# Patient Record
Sex: FEMALE | URBAN - METROPOLITAN AREA
[De-identification: names, ages, dates, MRNs, and addresses within clinical notes are randomized per-mention and may not be internally consistent; named-entity substitution may affect disease eponyms.]

---

## 2017-07-20 ENCOUNTER — IMPORTED ENCOUNTER (OUTPATIENT)
Dept: URBAN - METROPOLITAN AREA CLINIC 1 | Facility: CLINIC | Age: 57
End: 2017-07-20

## 2017-07-20 PROBLEM — H04.123: Noted: 2017-07-20

## 2017-07-20 PROBLEM — E11.3292: Noted: 2017-07-20

## 2017-07-20 PROBLEM — Z79.84: Noted: 2017-07-20

## 2017-07-20 PROBLEM — E11.9: Noted: 2017-07-20

## 2017-07-20 PROBLEM — H16.143: Noted: 2017-07-20

## 2017-07-20 PROBLEM — H25.813: Noted: 2017-07-20

## 2017-07-20 PROCEDURE — 92004 COMPRE OPH EXAM NEW PT 1/>: CPT

## 2017-07-20 NOTE — PATIENT DISCUSSION
1.  DM Type II (Oral Meds) without sign of diabetic retinopathy and no blot heme on dilated retinal examination today OD No Macular Edema:  Discussed the pathophysiology of diabetes and its effect on the eye and risk of blindness. Stressed the importance of strong glucose control. Advised of importance of at least yearly dilated examinations but to contact us immediately for any problems or concerns. 2.  DM Type II (Oral Meds) with mild Nonproliferative Diabetic Retinopathy OS No Macular Edema:  Discussed the pathophysiology of diabetes and its effect on the eye and risk of blindness. Stressed the importance of strong glucose control. Advised of importance of at least yearly dilated examinations but to contact us immediately for any problems or concerns. 3. AJ w/ PEK OU- Increase ATs TID OU Routinely; Use AT Gel Adonis QHS OU. 4.  Cataract OU: Observe for now without intervention. The patient was advised to contact us if any change or worsening of visionLetter to PCP Return for an appointment in 6 months 10 dfe with Dr. Dano Amezquita.

## 2017-08-04 RX ORDER — ONDANSETRON 4 MG/1
4 TABLET, ORALLY DISINTEGRATING ORAL AS NEEDED
Status: ON HOLD | COMMUNITY
Start: 2017-05-23 | End: 2017-08-10

## 2017-08-04 RX ORDER — DIPHENHYDRAMINE HCL 25 MG
25 TABLET ORAL
COMMUNITY

## 2017-08-04 RX ORDER — HYDROCODONE BITARTRATE AND ACETAMINOPHEN 10; 325 MG/1; MG/1
1 TABLET ORAL
COMMUNITY
End: 2017-08-10

## 2017-08-04 RX ORDER — GABAPENTIN 300 MG/1
300 CAPSULE ORAL 3 TIMES DAILY
COMMUNITY

## 2017-08-04 RX ORDER — ASPIRIN 81 MG/1
81 TABLET ORAL DAILY
COMMUNITY

## 2017-08-04 RX ORDER — ESOMEPRAZOLE MAGNESIUM 40 MG/1
40 CAPSULE, DELAYED RELEASE ORAL DAILY
Refills: 1 | COMMUNITY
Start: 2017-06-21 | End: 2020-04-08

## 2017-08-04 RX ORDER — METFORMIN HYDROCHLORIDE 750 MG/1
750 TABLET, EXTENDED RELEASE ORAL
Refills: 3 | COMMUNITY
Start: 2017-06-14 | End: 2020-04-08

## 2017-08-04 RX ORDER — UREA 10 %
800 LOTION (ML) TOPICAL DAILY
COMMUNITY

## 2017-08-04 RX ORDER — LISINOPRIL 2.5 MG/1
2.5 TABLET ORAL
COMMUNITY
Start: 2011-03-04

## 2017-08-04 RX ORDER — ERGOCALCIFEROL 1.25 MG/1
50000 CAPSULE ORAL 2 TIMES WEEKLY
COMMUNITY

## 2017-08-04 RX ORDER — ALPRAZOLAM 0.25 MG/1
0.25 TABLET ORAL
COMMUNITY

## 2017-08-04 RX ORDER — ATORVASTATIN CALCIUM 40 MG/1
40 TABLET, FILM COATED ORAL
COMMUNITY

## 2017-08-04 RX ORDER — HYDROMORPHONE HYDROCHLORIDE 2 MG/1
2 TABLET ORAL
Refills: 0 | COMMUNITY
Start: 2017-05-23 | End: 2017-08-10

## 2017-08-08 ENCOUNTER — ANESTHESIA EVENT (OUTPATIENT)
Dept: SURGERY | Age: 57
End: 2017-08-08
Payer: OTHER MISCELLANEOUS

## 2017-08-09 ENCOUNTER — APPOINTMENT (OUTPATIENT)
Dept: GENERAL RADIOLOGY | Age: 57
End: 2017-08-09
Attending: ORTHOPAEDIC SURGERY
Payer: OTHER MISCELLANEOUS

## 2017-08-09 ENCOUNTER — HOSPITAL ENCOUNTER (OUTPATIENT)
Age: 57
Setting detail: OBSERVATION
Discharge: HOME | End: 2017-08-10
Attending: ORTHOPAEDIC SURGERY | Admitting: ORTHOPAEDIC SURGERY
Payer: OTHER MISCELLANEOUS

## 2017-08-09 ENCOUNTER — ANESTHESIA (OUTPATIENT)
Dept: SURGERY | Age: 57
End: 2017-08-09
Payer: OTHER MISCELLANEOUS

## 2017-08-09 PROBLEM — M50.20 HNP (HERNIATED NUCLEUS PULPOSUS), CERVICAL: Status: ACTIVE | Noted: 2017-08-09

## 2017-08-09 LAB
EST. AVERAGE GLUCOSE BLD GHB EST-MCNC: 180 MG/DL
GLUCOSE BLD STRIP.AUTO-MCNC: 129 MG/DL (ref 70–110)
GLUCOSE BLD STRIP.AUTO-MCNC: 130 MG/DL (ref 70–110)
GLUCOSE BLD STRIP.AUTO-MCNC: 182 MG/DL (ref 70–110)
HBA1C MFR BLD: 7.9 % (ref 4.2–5.6)

## 2017-08-09 PROCEDURE — 77030003666 HC NDL SPINAL BD -A: Performed by: ORTHOPAEDIC SURGERY

## 2017-08-09 PROCEDURE — 77030020274 HC MISC IMPL ORTHOPEDIC: Performed by: ORTHOPAEDIC SURGERY

## 2017-08-09 PROCEDURE — 77030011640 HC PAD GRND REM COVD -A: Performed by: ORTHOPAEDIC SURGERY

## 2017-08-09 PROCEDURE — 77030006773 HC BLD SAW OSC BRSM -A: Performed by: ORTHOPAEDIC SURGERY

## 2017-08-09 PROCEDURE — 77030037736 HC GRFT BN BLCK ALLGRFT REGE -F: Performed by: ORTHOPAEDIC SURGERY

## 2017-08-09 PROCEDURE — C1713 ANCHOR/SCREW BN/BN,TIS/BN: HCPCS | Performed by: ORTHOPAEDIC SURGERY

## 2017-08-09 PROCEDURE — 36415 COLL VENOUS BLD VENIPUNCTURE: CPT | Performed by: HOSPITALIST

## 2017-08-09 PROCEDURE — 74011250636 HC RX REV CODE- 250/636: Performed by: NURSE ANESTHETIST, CERTIFIED REGISTERED

## 2017-08-09 PROCEDURE — 77030004391 HC BUR FLUT MEDT -C: Performed by: ORTHOPAEDIC SURGERY

## 2017-08-09 PROCEDURE — 76210000017 HC OR PH I REC 1.5 TO 2 HR: Performed by: ORTHOPAEDIC SURGERY

## 2017-08-09 PROCEDURE — 77030031139 HC SUT VCRL2 J&J -A: Performed by: ORTHOPAEDIC SURGERY

## 2017-08-09 PROCEDURE — 74011250636 HC RX REV CODE- 250/636: Performed by: ORTHOPAEDIC SURGERY

## 2017-08-09 PROCEDURE — 77030010938 HC CLP LIG TELE -A: Performed by: ORTHOPAEDIC SURGERY

## 2017-08-09 PROCEDURE — 74011250636 HC RX REV CODE- 250/636

## 2017-08-09 PROCEDURE — 74011000250 HC RX REV CODE- 250: Performed by: ORTHOPAEDIC SURGERY

## 2017-08-09 PROCEDURE — 74011000258 HC RX REV CODE- 258: Performed by: ORTHOPAEDIC SURGERY

## 2017-08-09 PROCEDURE — 74011000272 HC RX REV CODE- 272: Performed by: ORTHOPAEDIC SURGERY

## 2017-08-09 PROCEDURE — 83036 HEMOGLOBIN GLYCOSYLATED A1C: CPT | Performed by: HOSPITALIST

## 2017-08-09 PROCEDURE — 99218 HC RM OBSERVATION: CPT

## 2017-08-09 PROCEDURE — 77030014007 HC SPNG HEMSTAT J&J -B: Performed by: ORTHOPAEDIC SURGERY

## 2017-08-09 PROCEDURE — 77030008683 HC TU ET CUF COVD -A: Performed by: ANESTHESIOLOGY

## 2017-08-09 PROCEDURE — 76060000036 HC ANESTHESIA 2.5 TO 3 HR: Performed by: ORTHOPAEDIC SURGERY

## 2017-08-09 PROCEDURE — 77030030163 HC BN WAX J&J -A: Performed by: ORTHOPAEDIC SURGERY

## 2017-08-09 PROCEDURE — 76010000172 HC OR TIME 2.5 TO 3 HR INTENSV-TIER 1: Performed by: ORTHOPAEDIC SURGERY

## 2017-08-09 PROCEDURE — 77030009868 HC PIN DISTR CASPR AESC -B: Performed by: ORTHOPAEDIC SURGERY

## 2017-08-09 PROCEDURE — 77030010516 HC APPL HEMA CLP TELE -B: Performed by: ORTHOPAEDIC SURGERY

## 2017-08-09 PROCEDURE — 77030008477 HC STYL SATN SLP COVD -A: Performed by: ANESTHESIOLOGY

## 2017-08-09 PROCEDURE — L0120 CERV FLEX N/ADJ FOAM PRE OTS: HCPCS | Performed by: ORTHOPAEDIC SURGERY

## 2017-08-09 PROCEDURE — 77030018673: Performed by: ORTHOPAEDIC SURGERY

## 2017-08-09 PROCEDURE — 74011250637 HC RX REV CODE- 250/637: Performed by: NURSE ANESTHETIST, CERTIFIED REGISTERED

## 2017-08-09 PROCEDURE — 74011636637 HC RX REV CODE- 636/637: Performed by: ORTHOPAEDIC SURGERY

## 2017-08-09 PROCEDURE — 77030018836 HC SOL IRR NACL ICUM -A: Performed by: ORTHOPAEDIC SURGERY

## 2017-08-09 PROCEDURE — 77030021678 HC GLIDESCP STAT DISP VERT -B: Performed by: ANESTHESIOLOGY

## 2017-08-09 PROCEDURE — 77030030102 HC BIT DRL PYRNES K2M -B: Performed by: ORTHOPAEDIC SURGERY

## 2017-08-09 PROCEDURE — 74011250637 HC RX REV CODE- 250/637: Performed by: ORTHOPAEDIC SURGERY

## 2017-08-09 PROCEDURE — 77030013079 HC BLNKT BAIR HGGR 3M -A: Performed by: ANESTHESIOLOGY

## 2017-08-09 PROCEDURE — 74011000250 HC RX REV CODE- 250

## 2017-08-09 PROCEDURE — 77030027138 HC INCENT SPIROMETER -A

## 2017-08-09 PROCEDURE — 82962 GLUCOSE BLOOD TEST: CPT

## 2017-08-09 PROCEDURE — 77030006788 HC BLD SAW OSC STRY -B: Performed by: ORTHOPAEDIC SURGERY

## 2017-08-09 PROCEDURE — 74011250636 HC RX REV CODE- 250/636: Performed by: ANESTHESIOLOGY

## 2017-08-09 PROCEDURE — 77030032490 HC SLV COMPR SCD KNE COVD -B: Performed by: ORTHOPAEDIC SURGERY

## 2017-08-09 DEVICE — IMPLANTABLE DEVICE: Type: IMPLANTABLE DEVICE | Site: SPINE CERVICAL | Status: FUNCTIONAL

## 2017-08-09 RX ORDER — DEXAMETHASONE SODIUM PHOSPHATE 4 MG/ML
INJECTION, SOLUTION INTRA-ARTICULAR; INTRALESIONAL; INTRAMUSCULAR; INTRAVENOUS; SOFT TISSUE AS NEEDED
Status: DISCONTINUED | OUTPATIENT
Start: 2017-08-09 | End: 2017-08-09 | Stop reason: HOSPADM

## 2017-08-09 RX ORDER — ONDANSETRON 2 MG/ML
INJECTION INTRAMUSCULAR; INTRAVENOUS AS NEEDED
Status: DISCONTINUED | OUTPATIENT
Start: 2017-08-09 | End: 2017-08-09 | Stop reason: HOSPADM

## 2017-08-09 RX ORDER — HYDROMORPHONE HYDROCHLORIDE 2 MG/ML
2 INJECTION, SOLUTION INTRAMUSCULAR; INTRAVENOUS; SUBCUTANEOUS
Status: DISCONTINUED | OUTPATIENT
Start: 2017-08-09 | End: 2017-08-10

## 2017-08-09 RX ORDER — PROPOFOL 10 MG/ML
INJECTION, EMULSION INTRAVENOUS AS NEEDED
Status: DISCONTINUED | OUTPATIENT
Start: 2017-08-09 | End: 2017-08-09 | Stop reason: HOSPADM

## 2017-08-09 RX ORDER — SODIUM CHLORIDE AND POTASSIUM CHLORIDE .9; .15 G/100ML; G/100ML
SOLUTION INTRAVENOUS CONTINUOUS
Status: DISCONTINUED | OUTPATIENT
Start: 2017-08-09 | End: 2017-08-10 | Stop reason: HOSPADM

## 2017-08-09 RX ORDER — DIPHENHYDRAMINE HYDROCHLORIDE 50 MG/ML
25 INJECTION, SOLUTION INTRAMUSCULAR; INTRAVENOUS
Status: DISCONTINUED | OUTPATIENT
Start: 2017-08-09 | End: 2017-08-09 | Stop reason: HOSPADM

## 2017-08-09 RX ORDER — SODIUM CHLORIDE 0.9 % (FLUSH) 0.9 %
5-10 SYRINGE (ML) INJECTION AS NEEDED
Status: DISCONTINUED | OUTPATIENT
Start: 2017-08-09 | End: 2017-08-10 | Stop reason: HOSPADM

## 2017-08-09 RX ORDER — OXYCODONE AND ACETAMINOPHEN 10; 325 MG/1; MG/1
2 TABLET ORAL
Status: DISCONTINUED | OUTPATIENT
Start: 2017-08-09 | End: 2017-08-10

## 2017-08-09 RX ORDER — LIDOCAINE HYDROCHLORIDE 20 MG/ML
INJECTION, SOLUTION EPIDURAL; INFILTRATION; INTRACAUDAL; PERINEURAL AS NEEDED
Status: DISCONTINUED | OUTPATIENT
Start: 2017-08-09 | End: 2017-08-09 | Stop reason: HOSPADM

## 2017-08-09 RX ORDER — INSULIN LISPRO 100 [IU]/ML
INJECTION, SOLUTION INTRAVENOUS; SUBCUTANEOUS ONCE
Status: DISCONTINUED | OUTPATIENT
Start: 2017-08-09 | End: 2017-08-09 | Stop reason: HOSPADM

## 2017-08-09 RX ORDER — FENTANYL CITRATE 50 UG/ML
INJECTION, SOLUTION INTRAMUSCULAR; INTRAVENOUS AS NEEDED
Status: DISCONTINUED | OUTPATIENT
Start: 2017-08-09 | End: 2017-08-09 | Stop reason: HOSPADM

## 2017-08-09 RX ORDER — MIDAZOLAM HYDROCHLORIDE 1 MG/ML
INJECTION, SOLUTION INTRAMUSCULAR; INTRAVENOUS AS NEEDED
Status: DISCONTINUED | OUTPATIENT
Start: 2017-08-09 | End: 2017-08-09 | Stop reason: HOSPADM

## 2017-08-09 RX ORDER — MAGNESIUM SULFATE 100 %
4 CRYSTALS MISCELLANEOUS AS NEEDED
Status: DISCONTINUED | OUTPATIENT
Start: 2017-08-09 | End: 2017-08-09 | Stop reason: HOSPADM

## 2017-08-09 RX ORDER — DEXTROSE 50 % IN WATER (D50W) INTRAVENOUS SYRINGE
25-50 AS NEEDED
Status: DISCONTINUED | OUTPATIENT
Start: 2017-08-09 | End: 2017-08-10 | Stop reason: HOSPADM

## 2017-08-09 RX ORDER — ACETAMINOPHEN 325 MG/1
650 TABLET ORAL
Status: DISCONTINUED | OUTPATIENT
Start: 2017-08-09 | End: 2017-08-10 | Stop reason: HOSPADM

## 2017-08-09 RX ORDER — CEFAZOLIN SODIUM 2 G/50ML
2 SOLUTION INTRAVENOUS
Status: COMPLETED | OUTPATIENT
Start: 2017-08-09 | End: 2017-08-09

## 2017-08-09 RX ORDER — SODIUM CHLORIDE, SODIUM LACTATE, POTASSIUM CHLORIDE, CALCIUM CHLORIDE 600; 310; 30; 20 MG/100ML; MG/100ML; MG/100ML; MG/100ML
75 INJECTION, SOLUTION INTRAVENOUS CONTINUOUS
Status: DISCONTINUED | OUTPATIENT
Start: 2017-08-09 | End: 2017-08-09 | Stop reason: HOSPADM

## 2017-08-09 RX ORDER — NEOSTIGMINE METHYLSULFATE 5 MG/5 ML
SYRINGE (ML) INTRAVENOUS AS NEEDED
Status: DISCONTINUED | OUTPATIENT
Start: 2017-08-09 | End: 2017-08-09 | Stop reason: HOSPADM

## 2017-08-09 RX ORDER — HYDROMORPHONE HYDROCHLORIDE 2 MG/ML
1 INJECTION, SOLUTION INTRAMUSCULAR; INTRAVENOUS; SUBCUTANEOUS ONCE
Status: COMPLETED | OUTPATIENT
Start: 2017-08-09 | End: 2017-08-09

## 2017-08-09 RX ORDER — ONDANSETRON 2 MG/ML
4 INJECTION INTRAMUSCULAR; INTRAVENOUS
Status: DISCONTINUED | OUTPATIENT
Start: 2017-08-09 | End: 2017-08-10 | Stop reason: HOSPADM

## 2017-08-09 RX ORDER — MAGNESIUM SULFATE 100 %
4 CRYSTALS MISCELLANEOUS AS NEEDED
Status: DISCONTINUED | OUTPATIENT
Start: 2017-08-09 | End: 2017-08-10 | Stop reason: HOSPADM

## 2017-08-09 RX ORDER — SODIUM CHLORIDE, SODIUM LACTATE, POTASSIUM CHLORIDE, CALCIUM CHLORIDE 600; 310; 30; 20 MG/100ML; MG/100ML; MG/100ML; MG/100ML
50 INJECTION, SOLUTION INTRAVENOUS CONTINUOUS
Status: DISCONTINUED | OUTPATIENT
Start: 2017-08-09 | End: 2017-08-09 | Stop reason: HOSPADM

## 2017-08-09 RX ORDER — VECURONIUM BROMIDE FOR INJECTION 1 MG/ML
INJECTION, POWDER, LYOPHILIZED, FOR SOLUTION INTRAVENOUS AS NEEDED
Status: DISCONTINUED | OUTPATIENT
Start: 2017-08-09 | End: 2017-08-09 | Stop reason: HOSPADM

## 2017-08-09 RX ORDER — DEXTROSE 50 % IN WATER (D50W) INTRAVENOUS SYRINGE
25-50 AS NEEDED
Status: DISCONTINUED | OUTPATIENT
Start: 2017-08-09 | End: 2017-08-09 | Stop reason: HOSPADM

## 2017-08-09 RX ORDER — GLYCOPYRROLATE 0.2 MG/ML
INJECTION INTRAMUSCULAR; INTRAVENOUS AS NEEDED
Status: DISCONTINUED | OUTPATIENT
Start: 2017-08-09 | End: 2017-08-09 | Stop reason: HOSPADM

## 2017-08-09 RX ORDER — SODIUM CHLORIDE 0.9 % (FLUSH) 0.9 %
5-10 SYRINGE (ML) INJECTION EVERY 8 HOURS
Status: DISCONTINUED | OUTPATIENT
Start: 2017-08-09 | End: 2017-08-10 | Stop reason: HOSPADM

## 2017-08-09 RX ORDER — FAMOTIDINE 20 MG/1
20 TABLET, FILM COATED ORAL ONCE
Status: DISCONTINUED | OUTPATIENT
Start: 2017-08-10 | End: 2017-08-09 | Stop reason: HOSPADM

## 2017-08-09 RX ORDER — FAMOTIDINE 20 MG/1
20 TABLET, FILM COATED ORAL ONCE
Status: COMPLETED | OUTPATIENT
Start: 2017-08-09 | End: 2017-08-09

## 2017-08-09 RX ORDER — SODIUM CHLORIDE, SODIUM LACTATE, POTASSIUM CHLORIDE, CALCIUM CHLORIDE 600; 310; 30; 20 MG/100ML; MG/100ML; MG/100ML; MG/100ML
50 INJECTION, SOLUTION INTRAVENOUS CONTINUOUS
Status: DISCONTINUED | OUTPATIENT
Start: 2017-08-10 | End: 2017-08-09 | Stop reason: HOSPADM

## 2017-08-09 RX ORDER — INSULIN LISPRO 100 [IU]/ML
INJECTION, SOLUTION INTRAVENOUS; SUBCUTANEOUS
Status: DISCONTINUED | OUTPATIENT
Start: 2017-08-09 | End: 2017-08-10 | Stop reason: HOSPADM

## 2017-08-09 RX ORDER — HYDROMORPHONE HYDROCHLORIDE 2 MG/ML
0.5 INJECTION, SOLUTION INTRAMUSCULAR; INTRAVENOUS; SUBCUTANEOUS
Status: DISCONTINUED | OUTPATIENT
Start: 2017-08-09 | End: 2017-08-09 | Stop reason: HOSPADM

## 2017-08-09 RX ORDER — LIDOCAINE HYDROCHLORIDE 10 MG/ML
0.1 INJECTION, SOLUTION EPIDURAL; INFILTRATION; INTRACAUDAL; PERINEURAL AS NEEDED
Status: DISCONTINUED | OUTPATIENT
Start: 2017-08-09 | End: 2017-08-09 | Stop reason: HOSPADM

## 2017-08-09 RX ORDER — FENTANYL CITRATE 50 UG/ML
50 INJECTION, SOLUTION INTRAMUSCULAR; INTRAVENOUS AS NEEDED
Status: DISCONTINUED | OUTPATIENT
Start: 2017-08-09 | End: 2017-08-09 | Stop reason: HOSPADM

## 2017-08-09 RX ORDER — CYCLOBENZAPRINE HCL 10 MG
10 TABLET ORAL
COMMUNITY
End: 2017-08-10

## 2017-08-09 RX ORDER — HYDROMORPHONE HYDROCHLORIDE 1 MG/ML
INJECTION, SOLUTION INTRAMUSCULAR; INTRAVENOUS; SUBCUTANEOUS AS NEEDED
Status: DISCONTINUED | OUTPATIENT
Start: 2017-08-09 | End: 2017-08-09 | Stop reason: HOSPADM

## 2017-08-09 RX ADMIN — SODIUM CHLORIDE AND POTASSIUM CHLORIDE: 9; 1.49 INJECTION, SOLUTION INTRAVENOUS at 19:19

## 2017-08-09 RX ADMIN — HYDROMORPHONE HYDROCHLORIDE 0.4 MG: 1 INJECTION, SOLUTION INTRAMUSCULAR; INTRAVENOUS; SUBCUTANEOUS at 15:56

## 2017-08-09 RX ADMIN — SODIUM CHLORIDE, SODIUM LACTATE, POTASSIUM CHLORIDE, AND CALCIUM CHLORIDE: 600; 310; 30; 20 INJECTION, SOLUTION INTRAVENOUS at 14:00

## 2017-08-09 RX ADMIN — HYDROMORPHONE HYDROCHLORIDE 2 MG: 2 INJECTION INTRAMUSCULAR; INTRAVENOUS; SUBCUTANEOUS at 23:50

## 2017-08-09 RX ADMIN — Medication 3 MG: at 16:30

## 2017-08-09 RX ADMIN — OXYCODONE HYDROCHLORIDE AND ACETAMINOPHEN 2 TABLET: 10; 325 TABLET ORAL at 19:18

## 2017-08-09 RX ADMIN — CEFAZOLIN SODIUM 2 G: 2 SOLUTION INTRAVENOUS at 14:34

## 2017-08-09 RX ADMIN — PROPOFOL 120 MG: 10 INJECTION, EMULSION INTRAVENOUS at 14:24

## 2017-08-09 RX ADMIN — MIDAZOLAM HYDROCHLORIDE 2 MG: 1 INJECTION, SOLUTION INTRAMUSCULAR; INTRAVENOUS at 14:14

## 2017-08-09 RX ADMIN — SODIUM CHLORIDE, SODIUM LACTATE, POTASSIUM CHLORIDE, AND CALCIUM CHLORIDE: 600; 310; 30; 20 INJECTION, SOLUTION INTRAVENOUS at 15:07

## 2017-08-09 RX ADMIN — DEXAMETHASONE SODIUM PHOSPHATE 10 MG: 4 INJECTION, SOLUTION INTRA-ARTICULAR; INTRALESIONAL; INTRAMUSCULAR; INTRAVENOUS; SOFT TISSUE at 14:45

## 2017-08-09 RX ADMIN — FENTANYL CITRATE 100 MCG: 50 INJECTION, SOLUTION INTRAMUSCULAR; INTRAVENOUS at 14:22

## 2017-08-09 RX ADMIN — HYDROMORPHONE HYDROCHLORIDE 0.4 MG: 1 INJECTION, SOLUTION INTRAMUSCULAR; INTRAVENOUS; SUBCUTANEOUS at 15:41

## 2017-08-09 RX ADMIN — GLYCOPYRROLATE 0.2 MG: 0.2 INJECTION INTRAMUSCULAR; INTRAVENOUS at 16:30

## 2017-08-09 RX ADMIN — ONDANSETRON 4 MG: 2 INJECTION INTRAMUSCULAR; INTRAVENOUS at 16:26

## 2017-08-09 RX ADMIN — INSULIN LISPRO 3 UNITS: 100 INJECTION, SOLUTION INTRAVENOUS; SUBCUTANEOUS at 21:44

## 2017-08-09 RX ADMIN — CEFAZOLIN SODIUM 1 G: 1 INJECTION, POWDER, FOR SOLUTION INTRAMUSCULAR; INTRAVENOUS at 21:44

## 2017-08-09 RX ADMIN — SODIUM CHLORIDE, SODIUM LACTATE, POTASSIUM CHLORIDE, AND CALCIUM CHLORIDE 50 ML/HR: 600; 310; 30; 20 INJECTION, SOLUTION INTRAVENOUS at 10:50

## 2017-08-09 RX ADMIN — FENTANYL CITRATE 50 MCG: 50 INJECTION, SOLUTION INTRAMUSCULAR; INTRAVENOUS at 15:34

## 2017-08-09 RX ADMIN — Medication 10 ML: at 22:00

## 2017-08-09 RX ADMIN — FAMOTIDINE 20 MG: 20 TABLET ORAL at 10:51

## 2017-08-09 RX ADMIN — LIDOCAINE HYDROCHLORIDE 100 MG: 20 INJECTION, SOLUTION EPIDURAL; INFILTRATION; INTRACAUDAL; PERINEURAL at 14:22

## 2017-08-09 RX ADMIN — VECURONIUM BROMIDE FOR INJECTION 6 MG: 1 INJECTION, POWDER, LYOPHILIZED, FOR SOLUTION INTRAVENOUS at 14:24

## 2017-08-09 RX ADMIN — FENTANYL CITRATE 50 MCG: 50 INJECTION, SOLUTION INTRAMUSCULAR; INTRAVENOUS at 15:23

## 2017-08-09 RX ADMIN — HYDROMORPHONE HYDROCHLORIDE 1 MG: 2 INJECTION INTRAMUSCULAR; INTRAVENOUS; SUBCUTANEOUS at 12:31

## 2017-08-09 NOTE — PERIOP NOTES
TRANSFER - OUT REPORT:    Verbal report given to virginia yao(name) on Alonzo Coto  being transferred to 2221(unit) for routine post - op       Report consisted of patients Situation, Background, Assessment and   Recommendations(SBAR). Information from the following report(s) SBAR, OR Summary, Intake/Output and MAR was reviewed with the receiving nurse. Lines:   Peripheral IV 08/09/17 Left Hand (Active)   Site Assessment Clean, dry, & intact 8/9/2017 10:44 AM   Phlebitis Assessment 0 8/9/2017 10:44 AM   Infiltration Assessment 0 8/9/2017 10:44 AM   Dressing Status Clean, dry, & intact 8/9/2017 10:44 AM   Dressing Type Transparent;Tape 8/9/2017 10:44 AM   Hub Color/Line Status Pink; Infusing 8/9/2017 10:44 AM        Opportunity for questions and clarification was provided.       Patient transported with:   [employer]

## 2017-08-09 NOTE — IP AVS SNAPSHOT
[address] Patient: Agustin Amin MRN: [id number] :1960 You are allergic to the following Allergen Reactions Ciprofibrate Rash Lortab (Hydrocodone-Acetaminophen) Rash Itching Morphine Rash Itching Nsaids (Non-Steroidal Anti-Inflammatory Drug) Other (comments) Hx of gastric ulcer Sulfa (Sulfonamide Antibiotics) Rash Recent Documentation Height Weight BMI OB Status Smoking Status 1.651 m 80.5 kg 29.55 kg/m2 Hysterectomy Never Smoker Emergency Contacts Name Discharge Info Relation Home Work Mobile Tulio Chavarria [5] [phone] About your hospitalization You were admitted on:  2017 You last received care in the:  [address] [place] [address],[address] You were discharged on:  August 10, 2017 Unit phone number:  [phone] Why you were hospitalized Your primary diagnosis was:  Not on File Your diagnoses also included:  Hnp (Herniated Nucleus Pulposus), Cervical  
  
  
 
  
  
Providers Seen During Your Hospitalizations Provider Role Specialty Primary office phone Eliseo Wall MD Attending Provider Orthopedic Surgery [phone] Your Primary Care Physician (PCP) Primary Care Physician Office Phone Office Fax MAVERICK GLASER ** None ** ** None ** Follow-up Information Follow up With Details Comments Contact Info MD Eliseo Padilla MD In 2 weeks  [place] [phone] [facility] [address] [phone] Current Discharge Medication List  
  
CONTINUE these medications which have CHANGED Dose & Instructions Dispensing Information Comments Morning Noon Evening Bedtime HYDROmorphone 2 mg tablet Commonly known as:  DILAUDID What changed:   
- how much to take - when to take this Your last dose was: Your next dose is:    
   
   
 Dose:  2-4 mg Take 1-2 Tabs by mouth every four (4) hours as needed. Max Daily Amount: 24 mg. Quantity:  60 Tab Refills:  0 CONTINUE these medications which have NOT CHANGED Dose & Instructions Dispensing Information Comments Morning Noon Evening Bedtime ALPRAZolam 0.25 mg tablet Commonly known as:  Devorah Ream Your last dose was: Your next dose is:    
   
   
 Dose:  0.25 mg Take 0.25 mg by mouth nightly as needed. Refills:  0  
     
   
   
   
  
 aspirin delayed-release 81 mg tablet Your last dose was: Your next dose is:    
   
   
 Dose:  81 mg Take 81 mg by mouth daily. Refills:  0  
     
   
   
   
  
 atorvastatin 40 mg tablet Commonly known as:  LIPITOR Your last dose was: Your next dose is:    
   
   
 Dose:  40 mg Take 40 mg by mouth nightly. Refills:  0  
     
   
   
   
  
 cyclobenzaprine 10 mg tablet Commonly known as:  FLEXERIL Your last dose was: Your next dose is:    
   
   
 Dose:  10 mg Take 1 Tab by mouth three (3) times daily as needed for Muscle Spasm(s). Quantity:  90 Tab Refills:  0  
     
   
   
   
  
 diphenhydrAMINE 25 mg tablet Commonly known as:  BENADRYL Your last dose was: Your next dose is:    
   
   
 Dose:  25 mg Take 25 mg by mouth nightly as needed. Refills:  0  
     
   
   
   
  
 dulaglutide 0.75 mg/0.5 mL sub-q pen Commonly known as:  TRULICITY Your last dose was: Your next dose is:    
   
   
 Dose:  0.75 mg  
0.75 mg by SubCUTAneous route every seven (7) days. Refills:  0  
     
   
   
   
  
 esomeprazole 40 mg capsule Commonly known as:  Gary Brumfield Your last dose was: Your next dose is:    
   
   
 Dose:  40 mg Take 40 mg by mouth daily. Refills:  1 folic acid 665 mcg tablet Your last dose was: Your next dose is:    
   
   
 Dose:  800 mcg Take 800 mcg by mouth daily. Refills:  0  
     
   
   
   
  
 gabapentin 300 mg capsule Commonly known as:  NEURONTIN Your last dose was: Your next dose is:    
   
   
 Dose:  300 mg Take 300 mg by mouth three (3) times daily. Refills:  0 JANUVIA 100 mg tablet Generic drug:  SITagliptin Your last dose was: Your next dose is:    
   
   
 Dose:  100 mg Take 100 mg by mouth daily. Refills:  0  
     
   
   
   
  
 lisinopril 2.5 mg tablet Commonly known as:  Nadya Wyatt Your last dose was: Your next dose is:    
   
   
 Dose:  2.5 mg Take 2.5 mg by mouth nightly. Refills:  0  
     
   
   
   
  
 metFORMIN  mg tablet Commonly known as:  GLUCOPHAGE XR Your last dose was: Your next dose is:    
   
   
 Dose:  750 mg Take 750 mg by mouth nightly. Refills:  3 MYRBETRIQ 25 mg ER tablet Generic drug:  mirabegron ER Your last dose was: Your next dose is:    
   
   
 Dose:  25 mg Take 25 mg by mouth nightly. Refills:  0  
     
   
   
   
  
 ondansetron 4 mg disintegrating tablet Commonly known as:  ZOFRAN ODT Your last dose was: Your next dose is:    
   
   
 Dose:  4 mg Take 1 Tab by mouth as needed. Quantity:  30 Tab Refills:  0  
     
   
   
   
  
 VITAMIN D2 50,000 unit capsule Generic drug:  ergocalciferol Your last dose was: Your next dose is:    
   
   
 Dose:  35646 Units Take 50,000 Units by mouth two (2) times a week. Refills:  0 STOP taking these medications HYDROcodone-acetaminophen  mg tablet Commonly known as:  Elisa Harp Where to Get Your Medications Information on where to get these meds will be given to you by the nurse or doctor. ! Ask your nurse or doctor about these medications  
  cyclobenzaprine 10 mg tablet HYDROmorphone 2 mg tablet  
 ondansetron 4 mg disintegrating tablet Discharge Instructions DISCHARGE SUMMARY from Nurse The following personal items are in your possession at time of discharge: 
 
Dental Appliances: None Visual Aid: None Home Medications: None Jewelry: None PATIENT INSTRUCTIONS: 
 
 
F-face looks uneven A-arms unable to move or move unevenly S-speech slurred or non-existent T-time-call 911 as soon as signs and symptoms begin-DO NOT go Back to bed or wait to see if you get better-TIME IS BRAIN. Warning Signs of HEART ATTACK Call 911 if you have these symptoms:  Chest discomfort. Most heart attacks involve discomfort in the center of the chest that lasts more than a few minutes, or that goes away and comes back. It can feel like uncomfortable pressure, squeezing, fullness, or pain.  Discomfort in other areas of the upper body. Symptoms can include pain or discomfort in one or both arms, the back, neck, jaw, or stomach.  Shortness of breath with or without chest discomfort.  Other signs may include breaking out in a cold sweat, nausea, or lightheadedness. Don't wait more than five minutes to call [address]! Fast action can save your life. Calling 911 is almost always the fastest way to get lifesaving treatment. Emergency Medical Services staff can begin treatment when they arrive  up to an hour sooner than if someone gets to the hospital by car. The discharge information has been reviewed with the patient. The patient verbalized understanding. Discharge medications reviewed with the patient and appropriate educational materials and side effects teaching were provided. MyChart Activation Thank you for requesting access to [employer]. Please follow the instructions below to securely access and download your online medical record. [employer] allows you to send messages to your doctor, view your test results, renew your prescriptions, schedule appointments, and more. How Do I Sign Up? 1. In your internet browser, go to [email or web address] 
2. Click on the First Time User? Click Here link in the Sign In box. You will be redirect to the New Member Sign Up page. 3. Enter your [employer] Access Code exactly as it appears below. You will not need to use this code after youve completed the sign-up process. If you do not sign up before the expiration date, you must request a new code. [employer] Access Code: [email or web address] Expires: 2017  9:24 AM (This is the date your [employer] access code will ) 4. Enter the last four digits of your Social Security Number (xxxx) and Date of Birth (mm/dd/yyyy) as indicated and click Submit. You will be taken to the next sign-up page. 5. Create a [employer] ID. This will be your [employer] login ID and cannot be changed, so think of one that is secure and easy to remember. 6. Create a [employer] password. You can change your password at any time. 7. Enter your Password Reset Question and Answer. This can be used at a later time if you forget your password. 8. Enter your e-mail address. You will receive e-mail notification when new information is available in [address] 9. Click Sign Up. You can now view and download portions of your medical record. 10. Click the Download Summary menu link to download a portable copy of your medical information. Additional Information If you have questions, please visit the Frequently Asked Questions section of the [employer] website at [email or web address] Remember, [employer] is NOT to be used for urgent needs. For medical emergencies, dial 911. Patient armband removed and shredded Discharge Instructions Attachments/References CERVICAL DISCECTOMY: POST-OP (ENGLISH) Discharge Orders None [employer] Announcement We are excited to announce that we are making your provider's discharge notes available to you in [employer]. You will see these notes when they are completed and signed by the physician that discharged you from your recent hospital stay. If you have any questions or concerns about any information you see in [employer], please call the Health Information Department where you were seen or reach out to your Primary Care Provider for more information about your plan of care. Introducing [facility]! Lady Saavedra introduces [employer] patient portal. Now you can access parts of your medical record, email your doctor's office, and request medication refills online. 1. In your internet browser, go to [email or web address] 2. Click on the First Time User? Click Here link in the Sign In box. You will see the New Member Sign Up page. 3. Enter your [employer] Access Code exactly as it appears below. You will not need to use this code after youve completed the sign-up process. If you do not sign up before the expiration date, you must request a new code. · [employer] Access Code: [email or web address] Expires: 11/7/2017  9:24 AM 
 
4. Enter the last four digits of your Social Security Number (xxxx) and Date of Birth (mm/dd/yyyy) as indicated and click Submit. You will be taken to the next sign-up page. 5. Create a [employer] ID. This will be your [employer] login ID and cannot be changed, so think of one that is secure and easy to remember. 6. Create a [employer] password. You can change your password at any time. 7. Enter your Password Reset Question and Answer. This can be used at a later time if you forget your password. 8. Enter your e-mail address. You will receive e-mail notification when new information is available in [address] 9. Click Sign Up.  You can now view and download portions of your medical record. 10. Click the Download Summary menu link to download a portable copy of your medical information. If you have questions, please visit the Frequently Asked Questions section of the [employer] website. Remember, [employer] is NOT to be used for urgent needs. For medical emergencies, dial 911. Now available from your iPhone and Android! General Information Please provide this summary of care documentation to your next provider. Patient Signature:  ____________________________________________________________ Date:  ____________________________________________________________  
  
Ángela Caicedo Provider Signature:  ____________________________________________________________ Date:  ____________________________________________________________ More Information Cervical Discectomy: What to Expect at [facility] Your Recovery You can expect your neck to feel stiff or sore after surgery. This should improve in the weeks after surgery. You may have trouble sitting or standing in one position for very long and may need pain medicine in the weeks after your surgery. It may take up to 8 weeks to get back to your usual activities, but it may depend on what kind of surgery you had. Your doctor may advise you to work with a physical therapist to strengthen the muscles around your neck and back. This care sheet gives you a general idea about how long it will take for you to recover. But each person recovers at a different pace. Follow the steps below to get better as quickly as possible. How can you care for yourself at home? Activity · Rest when you feel tired. Getting enough sleep will help you recover. · Try to walk each day. Start by walking a little more than you did the day before. Bit by bit, increase the amount you walk. Walking boosts blood flow and helps prevent pneumonia and constipation.  Walking may also decrease your muscle soreness after surgery. · Avoid lifting anything that would make you strain. This may include grocery bags and milk containers, a heavy briefcase or backpack, cat litter or dog food bags, a vacuum , or a child. · Avoid strenuous activities, such as bicycle riding, jogging, weightlifting, or aerobic exercise, until your doctor says it is okay. · Ask your doctor when you can drive again. · Avoid taking long car trips for 2 to 4 weeks after surgery. Your neck may become tired and painful from sitting too long in one position. · Your time off from work depends on how quickly you feel better and on the type of work you do. If you work in an office, you likely can go back to work sooner than if you have a job where you are very active. Talk with your doctor about your work needs. · You may have sex as soon as you feel able, but avoid positions that put stress on your neck or cause pain. Diet · You can eat your normal diet. If your stomach is upset, try bland, low-fat foods like plain rice, broiled chicken, toast, and yogurt. · Drink plenty of fluids (unless your doctor tells you not to). · You may notice that your bowel movements are not regular right after your surgery. This is common. Try to avoid constipation and straining with bowel movements. You may want to take a fiber supplement every day. If you have not had a bowel movement after a couple of days, ask your doctor about taking a mild laxative. Medicines · Be safe with medicines. Take pain medicines exactly as directed. ¨ If the doctor gave you a prescription medicine for pain, take it as prescribed. ¨ If you are not taking a prescription pain medicine, ask your doctor if you can take an over-the-counter medicine. · If your doctor prescribed antibiotics, take them as directed. Do not stop taking them just because you feel better. You need to take the full course of antibiotics.  
· If you think your pain medicine is making you sick to your stomach: 
¨ Take your medicine after meals (unless your doctor has told you not to). ¨ Ask your doctor for a different pain medicine. Incision care · If you have strips of tape on the cut (incision) the doctor made, leave the tape on for a week or until it falls off. · Wash the area daily with warm, soapy water, and pat it dry. Don't use hydrogen peroxide or alcohol, which can slow healing. You may cover the area with a gauze bandage if it weeps or rubs against clothing. Change the dressing every day. · Keep the area clean and dry. Exercise · Do exercises as instructed by your doctor or physical therapist to improve your strength and flexibility. Other instructions · To reduce stiffness and help sore muscles, use a warm water bottle, a heating pad set on low, or a warm cloth on your neck. Do not put heat right over the incision. Do not go to sleep with a heating pad on your skin. Follow-up care is a key part of your treatment and safety. Be sure to make and go to all appointments, and call your doctor if you are having problems. It's also a good idea to know your test results and keep a list of the medicines you take. When should you call for help? Call 911 anytime you think you may need emergency care. For example, call if: 
· You passed out (lost consciousness). · You have sudden chest pain and shortness of breath, or you cough up blood. · You cannot swallow. · You have severe pain in your neck or back. · You are unable to move an arm or a leg at all. Call your doctor now or seek immediate medical care if: 
· You have pain that does not get better after you take pain pills. · You have loose stitches, or your incision comes open. · You have blood or fluid draining from the incision. · You have signs of infection, such as: 
¨ Increased pain, swelling, warmth, or redness. ¨ Red streaks leading from the site. ¨ Pus draining from the site. ¨ Swollen lymph nodes in your neck or armpits.  
¨ A fever. 
· You have new or worse symptoms in your arms, legs, chest, belly, or buttocks. Symptoms may include: ¨ Numbness or tingling. ¨ Weakness. ¨ Pain. · You lose bladder or bowel control. Watch closely for any changes in your health, and be sure to contact your doctor if: 
· You do not have a bowel movement after taking a laxative. · You are not getting better as expected. Where can you learn more? Go to http://kiana-matti.info/. Enter R489 in the search box to learn more about \"Cervical Discectomy: What to Expect at Home. \" Current as of: March 21, 2017 Content Version: 11.3 © 8245-4714 [employer], [employer]ault [employer]. Care instructions adapted under license by [employer] (which disclaims liability or warranty for this information). If you have questions about a medical condition or this instruction, always ask your healthcare professional. [address] any warranty or liability for your use of this information.

## 2017-08-09 NOTE — H&P
Day of Surgery Update:  Warren Felix was seen and examined. History and physical has been reviewed. The patient has been examined.  There have been no significant clinical changes since the completion of the originally dated History and Physical.    Signed By: Laci Malik MD     August 9, 2017 7:57 AM

## 2017-08-09 NOTE — H&P
Consult Note    Patient: Ángela Burgos               Sex: female          DOA: 8/9/2017         YOB: 1960      Age:  49 y.o.        LOS:  LOS: 0 days              HPI:     Ángela Burgos is a 49 y.o. female who is s/p herniated disc  With removal C-neha explore fusion ,anterior cercival discectomy  And fusion ,bone bank,bone graft plate H5-5. The pt is dong well post op . She has a h/o diabetes and htn and she will be on a sliding scale she is s/p previous cervuical fusion and has had a gastric bypass  Bmp is unremarkable . Pt  will be followed post op    Past Medical History:   Diagnosis Date    Diabetes ([address]     Hypertension        Past Surgical History:   Procedure Laterality Date    HX CERVICAL FUSION  2008    HX CHOLECYSTECTOMY  1986    HX GASTRIC BYPASS  2007    HX HYSTERECTOMY  2001    Has left ovary       History reviewed. No pertinent family history. Social History     Social History    Marital status:      Spouse name: N/A    Number of children: N/A    Years of education: N/A     Social History Main Topics    Smoking status: Never Smoker    Smokeless tobacco: Never Used    Alcohol use No    Drug use: No    Sexual activity: Not Asked     Other Topics Concern    None     Social History Narrative       Prior to Admission medications    Medication Sig Start Date End Date Taking? Authorizing Provider   cyclobenzaprine (FLEXERIL) 10 mg tablet Take 10 mg by mouth three (3) times daily as needed for Muscle Spasm(s). Indications: MUSCLE SPASM   Yes Historical Provider   ALPRAZolam (XANAX) 0.25 mg tablet Take 0.25 mg by mouth nightly as needed. Yes Historical Provider   atorvastatin (LIPITOR) 40 mg tablet Take 40 mg by mouth nightly. Yes Historical Provider   diphenhydrAMINE (BENADRYL) 25 mg tablet Take 25 mg by mouth nightly as needed. Yes Historical Provider   dulaglutide (TRULICITY) 3.02 GT/2.7 mL sub-q pen 0.75 mg by SubCUTAneous route every seven (7) days.    Yes Historical Provider   lisinopril (PRINIVIL, ZESTRIL) 2.5 mg tablet Take 2.5 mg by mouth nightly. 3/4/11  Yes Historical Provider   ondansetron (ZOFRAN ODT) 4 mg disintegrating tablet Take 4 mg by mouth as needed. 5/23/17  Yes Historical Provider   esomeprazole (NEXIUM) 40 mg capsule Take 40 mg by mouth daily. 6/21/17  Yes Historical Provider   HYDROcodone-acetaminophen (NORCO)  mg tablet Take 1 Tab by mouth every six (6) hours as needed for Pain. Yes Historical Provider   SITagliptin (JANUVIA) 100 mg tablet Take 100 mg by mouth daily. Yes Historical Provider   folic acid 821 mcg tablet Take 800 mcg by mouth daily. Yes Historical Provider   aspirin delayed-release 81 mg tablet Take 81 mg by mouth daily. Yes Historical Provider   ergocalciferol (VITAMIN D2) 50,000 unit capsule Take 50,000 Units by mouth two (2) times a week. Yes Historical Provider   gabapentin (NEURONTIN) 300 mg capsule Take 300 mg by mouth three (3) times daily. Yes Historical Provider   mirabegron ER (MYRBETRIQ) 25 mg ER tablet Take 25 mg by mouth nightly. Yes Historical Provider   HYDROmorphone (DILAUDID) 2 mg tablet Take 2 mg by mouth nightly as needed. 5/23/17   Historical Provider   metFORMIN ER (GLUCOPHAGE XR) 750 mg tablet Take 750 mg by mouth nightly. 6/14/17   Historical Provider       Allergies   Allergen Reactions    Ciprofibrate Rash    Lortab [Hydrocodone-Acetaminophen] Rash and Itching    Morphine Rash and Itching    Nsaids (Non-Steroidal Anti-Inflammatory Drug) Other (comments)     Hx of gastric ulcer    Sulfa (Sulfonamide Antibiotics) Rash       Review of Systems pt was sedated when seen post op .  She was able to move all extremities        Physical Exam:      Visit Vitals    /87    Pulse (!) 101    Temp 98.2 °F (36.8 °C)    Resp 18    Ht 5' 5\" (1.651 m)    Wt 80.5 kg (177 lb 9 oz)    SpO2 93%    BMI 29.55 kg/m2       Physical Exam:  Pt is s/p cervical fusion   Heart reg rate andf rhythm   Lungs good breath sounds heard   Abdomen soft nd no masses felt   neuro drowsy but nofocal            Labs Reviewed:  CMP: No results found for: NA, K, CL, CO2, AGAP, GLU, BUN, CREA, GFRAA, GFRNA, CA, MG, PHOS, ALB, TBIL, TP, ALB, GLOB, AGRAT, SGOT, ALT, GPT  CBC: No results found for: WBC, HGB, HGBEXT, HCT, HCTEXT, PLT, PLTEXT, HGBEXT, HCTEXT, PLTEXT    Assessment/Plan     Active Problems:    HNP (herniated nucleus pulposus), cervical (8/9/2017)  S/p gastric bypass   Diabetes mellitus   htn     Plan will follow post op .  Thanks

## 2017-08-09 NOTE — H&P
BRIEF OPERATIVE NOTE    Date of Procedure: 8/9/2017     Preoperative Diagnosis: hnp m50.20    Postoperative Diagnosis: hnp m50.20      Procedure: Procedure(s):  REMOVE C-LARRY EXPLORE FUSION, ANTERIOR CERVICAL DISKECTOMY AND FUSION, BONE BANK BONE GRAFT PLATE Y4-4    Surgeon(s) and Role:     * Belkis Holcomb MD - Primary    Anesthesia: General     Findings: plate intact and fusion solid c5-6. Extruded r c6-7 hnp     Estimated Blood Loss: 25  Replaced0      [id number]        Urine0    Specimens: * No specimens in log *     Tubes/Drains: None    Needle/sponge count:  Correct    Complications: 0    Plan    Up at [facility]  Home in am with percocet and collar  rto 2 wks.

## 2017-08-09 NOTE — ANESTHESIA PREPROCEDURE EVALUATION
Anesthetic History   No history of anesthetic complications            Review of Systems / Medical History  Patient summary reviewed and pertinent labs reviewed    Pulmonary  Within defined limits                 Neuro/Psych   Within defined limits           Cardiovascular    Hypertension: well controlled              Exercise tolerance: >4 METS     GI/Hepatic/Renal     GERD: well controlled           Endo/Other    Diabetes: type 2         Other Findings   Comments:   Risk Factors for Postoperative nausea/vomiting:       History of postoperative nausea/vomiting? NO       Female? YES       Motion sickness? NO       Intended opioid administration for postoperative analgesia? YES      Smoking Abstinence  Current Smoker? NO  Elective Surgery? YES  Seen preoperatively by anesthesiologist or proxy prior to day of surgery? YES  Pt abstained from smoking 24 hours prior to anesthesia?  N/A           Physical Exam    Airway  Mallampati: II  TM Distance: 4 - 6 cm  Neck ROM: decreased range of motion   Mouth opening: Normal     Cardiovascular  Regular rate and rhythm,  S1 and S2 normal,  no murmur, click, rub, or gallop  Rhythm: regular  Rate: normal         Dental  No notable dental hx       Pulmonary  Breath sounds clear to auscultation               Abdominal  GI exam deferred       Other Findings            Anesthetic Plan    ASA: 2  Anesthesia type: general          Induction: Intravenous  Anesthetic plan and risks discussed with: Patient

## 2017-08-10 VITALS
WEIGHT: 177.56 LBS | DIASTOLIC BLOOD PRESSURE: 88 MMHG | RESPIRATION RATE: 18 BRPM | SYSTOLIC BLOOD PRESSURE: 134 MMHG | TEMPERATURE: 97.7 F | HEIGHT: 65 IN | BODY MASS INDEX: 29.58 KG/M2 | OXYGEN SATURATION: 95 % | HEART RATE: 101 BPM

## 2017-08-10 LAB
ALBUMIN SERPL BCP-MCNC: 3.2 G/DL (ref 3.4–5)
ALBUMIN/GLOB SERPL: 1.3 {RATIO} (ref 0.8–1.7)
ALP SERPL-CCNC: 76 U/L (ref 45–117)
ALT SERPL-CCNC: 24 U/L (ref 13–56)
ANION GAP BLD CALC-SCNC: 9 MMOL/L (ref 3–18)
AST SERPL W P-5'-P-CCNC: 24 U/L (ref 15–37)
BASOPHILS # BLD AUTO: 0 K/UL (ref 0–0.06)
BASOPHILS # BLD: 0 % (ref 0–2)
BILIRUB SERPL-MCNC: 0.5 MG/DL (ref 0.2–1)
BUN SERPL-MCNC: 12 MG/DL (ref 7–18)
BUN/CREAT SERPL: 19 (ref 12–20)
CALCIUM SERPL-MCNC: 8.4 MG/DL (ref 8.5–10.1)
CHLORIDE SERPL-SCNC: 101 MMOL/L (ref 100–108)
CO2 SERPL-SCNC: 26 MMOL/L (ref 21–32)
CREAT SERPL-MCNC: 0.62 MG/DL (ref 0.6–1.3)
DIFFERENTIAL METHOD BLD: ABNORMAL
EOSINOPHIL # BLD: 0 K/UL (ref 0–0.4)
EOSINOPHIL NFR BLD: 0 % (ref 0–5)
ERYTHROCYTE [DISTWIDTH] IN BLOOD BY AUTOMATED COUNT: 13.2 % (ref 11.6–14.5)
GLOBULIN SER CALC-MCNC: 2.4 G/DL (ref 2–4)
GLUCOSE BLD STRIP.AUTO-MCNC: 136 MG/DL (ref 70–110)
GLUCOSE BLD STRIP.AUTO-MCNC: 198 MG/DL (ref 70–110)
GLUCOSE SERPL-MCNC: 241 MG/DL (ref 74–99)
HCT VFR BLD AUTO: 37.8 % (ref 35–45)
HGB BLD-MCNC: 12.6 G/DL (ref 12–16)
LYMPHOCYTES # BLD AUTO: 11 % (ref 21–52)
LYMPHOCYTES # BLD: 0.9 K/UL (ref 0.9–3.6)
MCH RBC QN AUTO: 29.6 PG (ref 24–34)
MCHC RBC AUTO-ENTMCNC: 33.3 G/DL (ref 31–37)
MCV RBC AUTO: 88.9 FL (ref 74–97)
MONOCYTES # BLD: 0.4 K/UL (ref 0.05–1.2)
MONOCYTES NFR BLD AUTO: 4 % (ref 3–10)
NEUTS SEG # BLD: 6.9 K/UL (ref 1.8–8)
NEUTS SEG NFR BLD AUTO: 85 % (ref 40–73)
PLATELET # BLD AUTO: 197 K/UL (ref 135–420)
PMV BLD AUTO: 10.4 FL (ref 9.2–11.8)
POTASSIUM SERPL-SCNC: 4.7 MMOL/L (ref 3.5–5.5)
PROT SERPL-MCNC: 5.6 G/DL (ref 6.4–8.2)
RBC # BLD AUTO: 4.25 M/UL (ref 4.2–5.3)
SODIUM SERPL-SCNC: 136 MMOL/L (ref 136–145)
WBC # BLD AUTO: 8.2 K/UL (ref 4.6–13.2)

## 2017-08-10 PROCEDURE — 97162 PT EVAL MOD COMPLEX 30 MIN: CPT

## 2017-08-10 PROCEDURE — 74011250637 HC RX REV CODE- 250/637: Performed by: PHYSICIAN ASSISTANT

## 2017-08-10 PROCEDURE — 97116 GAIT TRAINING THERAPY: CPT

## 2017-08-10 PROCEDURE — 82962 GLUCOSE BLOOD TEST: CPT

## 2017-08-10 PROCEDURE — 80053 COMPREHEN METABOLIC PANEL: CPT | Performed by: HOSPITALIST

## 2017-08-10 PROCEDURE — G8978 MOBILITY CURRENT STATUS: HCPCS

## 2017-08-10 PROCEDURE — 74011000258 HC RX REV CODE- 258: Performed by: ORTHOPAEDIC SURGERY

## 2017-08-10 PROCEDURE — G8980 MOBILITY D/C STATUS: HCPCS

## 2017-08-10 PROCEDURE — 85025 COMPLETE CBC W/AUTO DIFF WBC: CPT | Performed by: HOSPITALIST

## 2017-08-10 PROCEDURE — 74011636637 HC RX REV CODE- 636/637: Performed by: ORTHOPAEDIC SURGERY

## 2017-08-10 PROCEDURE — 36415 COLL VENOUS BLD VENIPUNCTURE: CPT | Performed by: HOSPITALIST

## 2017-08-10 PROCEDURE — 99218 HC RM OBSERVATION: CPT

## 2017-08-10 PROCEDURE — 74011250636 HC RX REV CODE- 250/636: Performed by: ORTHOPAEDIC SURGERY

## 2017-08-10 PROCEDURE — 74011250637 HC RX REV CODE- 250/637: Performed by: ORTHOPAEDIC SURGERY

## 2017-08-10 RX ORDER — CYCLOBENZAPRINE HCL 10 MG
10 TABLET ORAL
Qty: 90 TAB | Refills: 0 | Status: SHIPPED | OUTPATIENT
Start: 2017-08-10

## 2017-08-10 RX ORDER — HYDROMORPHONE HYDROCHLORIDE 2 MG/1
2-4 TABLET ORAL
Status: DISCONTINUED | OUTPATIENT
Start: 2017-08-10 | End: 2017-08-10 | Stop reason: HOSPADM

## 2017-08-10 RX ORDER — CYCLOBENZAPRINE HCL 10 MG
10 TABLET ORAL
Status: DISCONTINUED | OUTPATIENT
Start: 2017-08-10 | End: 2017-08-10 | Stop reason: HOSPADM

## 2017-08-10 RX ORDER — HYDROMORPHONE HYDROCHLORIDE 2 MG/1
2-4 TABLET ORAL
Qty: 60 TAB | Refills: 0 | Status: SHIPPED | OUTPATIENT
Start: 2017-08-10 | End: 2020-04-08

## 2017-08-10 RX ORDER — ONDANSETRON 4 MG/1
4 TABLET, ORALLY DISINTEGRATING ORAL AS NEEDED
Qty: 30 TAB | Refills: 0 | Status: SHIPPED | OUTPATIENT
Start: 2017-08-10

## 2017-08-10 RX ADMIN — OXYCODONE HYDROCHLORIDE AND ACETAMINOPHEN 2 TABLET: 10; 325 TABLET ORAL at 03:39

## 2017-08-10 RX ADMIN — SODIUM CHLORIDE AND POTASSIUM CHLORIDE: 9; 1.49 INJECTION, SOLUTION INTRAVENOUS at 05:50

## 2017-08-10 RX ADMIN — CYCLOBENZAPRINE HYDROCHLORIDE 10 MG: 10 TABLET, FILM COATED ORAL at 10:23

## 2017-08-10 RX ADMIN — INSULIN LISPRO 3 UNITS: 100 INJECTION, SOLUTION INTRAVENOUS; SUBCUTANEOUS at 08:32

## 2017-08-10 RX ADMIN — CEFAZOLIN SODIUM 1 G: 1 INJECTION, POWDER, FOR SOLUTION INTRAMUSCULAR; INTRAVENOUS at 05:49

## 2017-08-10 RX ADMIN — OXYCODONE HYDROCHLORIDE AND ACETAMINOPHEN 2 TABLET: 10; 325 TABLET ORAL at 08:32

## 2017-08-10 NOTE — PROGRESS NOTES
Received bedside shift report from Lluvia Avery RN. Pt resting in bed, white board updated, call bell within reach. Dressing clean, dry, intact. Pain rated at a 1 out of 10.     0915 UZMA Herrera, made aware that pt wants flexeril for pain control as well. 1004 Pt stated that her ride will be here after lunch.

## 2017-08-10 NOTE — PROGRESS NOTES
Patient has designated her fiances to participate in his/her discharge plan and to receive any needed information.      Name:   Myesha Wright  friend   [phone]      Aug 10, 2017     Address:  Phone number:

## 2017-08-10 NOTE — PROGRESS NOTES
Brandon   Discharge Planning/ Assessment    Reasons for Intervention: Interviewed patient, she agrees to share her discharge information with her darlenee, see below. She was independent prior to admission and see Dr Bandar Menjivar for her primary care needs. Her discharge plan is to return home.      High Risk Criteria  [x] Yes  []No   Physician Referral  [] Yes  [x]No        Date    Nursing Referral  [] Yes  [x]No        Date    Patient/Family Request  [] Yes  [x]No        Date       Resources:    Medicare  [] Yes  [x]No   Medicaid  [] Yes  [x]No   No Resources  [] Yes  [x]No   Private Insurance  [] Yes  [x]No     Name/Phone Number    Other  [x] Yes  []No        (i.e. Workman's Comp) workmans compGurinder Etienne [id number]        Prior Services:    Prior Services  [] Yes  [x]No   Home Health  [] Yes  [x]No   [address]  [] Yes  [x]No        Number of [address]  [] Yes  [x]No       Meals on Wheels  [] Yes  [x]No   Office on Aging  [] Yes  [x]No   Transportation Services  [] Yes  [x]No   Nursing Home  [] Yes  [x]No        Nursing Home Name    [address]  [] Yes  [x]No        [address] Name    Other       Information Source:      Information obtained from  [x] Patient  [] Parent   [] [address]  [] Child  [] Spouse   [] Significant Other/Partner   [] Friend      [] EMS    [] Nursing Home Chart          [x] Other: chart   Chart Review  [x] Yes  []No     Family/Support System:    Patient lives with  [] Alone    [] Spouse   [x] Significant Other  [] Children  [] Caretaker   [] Parent  [] Sibling     [] Other       Other Support System:    Is the patient responsible for care of others  [] Yes  [x]No   Information of person caring for patient on  discharge self   Managers financial affairs independently  [x] Yes  []No   If no, explain:      Status Prior to Admission:    Mental Status  [x] Awake  [x] Alert  [x] Oriented  [] Quiet/Calm [] Lethargic/Sedated   [] Disoriented  [] Restless/Anxious  [] Combative   Personal Care  [] Dependent  [x] Independent Personal Care  [] Requires Assistance   Meal Preparation Ability  [x] Independent   [] Standby Assistance   [] Minimal Assistance   [] Moderate Assistance  [] Maximum Assistance     [] Total Assistance   Chores  [x] Independent with Chores   [] N/A Nursing Home Resident   [] Requires Assistance   Bowel/Bladder  [x] Continent  [] Catheter  [] Incontinent  [] Ostomy Self-Care    [] Urine Diversion Self-Care  [] Maximum Assistance     [] Total Assistance   Number of Persons needed for assistance    DME at home  [] Clearnce Acre, Valentino Ip  [] [address], Straight   [] Commode    [] Bathroom/Grab Bars  [] Hospital Bed  [] Nebulizer  [] Oxygen           [] Raised Toilet Seat  [] Shower Chair  [] Side Rails for Bed   [] Tub Transfer Bench   [] Aleja Sermons  [] Carmen Medina, Standard      [] Other:   Vendor      Treatment Presently Receiving:    Current Treatments  [] Chemotherapy  [] Dialysis  [] Insulin  [] IVAB [x] IVF   [] O2  [] PCA   [] PT   [] RT   [] Tube Feedings   [] Wound Care     Psychosocial Evaluation:    Verbalized Knowledge of Disease Process  [x] Patient  [x]Family   Coping with Disease Process  [x] Patient  [x]Family   Requires Further Counseling Coping with Disease Process  [] Patient  []Family     Identified Projected Needs:    Home Health Aid  [] Yes  [x]No   Transportation  [] Yes  [x]No   Education  [] Yes  [x]No        Specific Education     Financial Counseling  [] Yes  [x]No   Inability to Care for Self/Will Require 24 hour care  [] Yes  [x]No   Pain Management  [] Yes  [x]No   Home Infusion Therapy  [] Yes  [x]No   Oxygen Therapy  [] Yes  [x]No   DME  [] Yes  [x]No   Long Term Care Placement  [] Yes  [x]No   Rehab  [] Yes  [x]No   Physical Therapy  [] Yes  [x]No   Needs Anticipated At This Time  [] Yes  [x]No     Intra-Hospital Referral:    [address] [facility]  [] Yes  [x]No     [] Yes  [x]No Patient Representative  [] Yes  [x]No   Staff for Teaching Needs  [] Yes  [x]No   Specialty Teaching Needs     Diabetic Educator  [] Yes  [x]No   Referral for Diabetic Educator Needed  [] Yes  [x]No  If Yes, place order for Nutritionist or Diabetic Consult     Tentative Discharge Plan:    Home with No Services  [x] Yes  []No   Home with [address]  [] Yes  [x]No        If Yes, specify type    [address]  [] Yes  [x]No        If Yes, specify type    Meals on Wheels  [] Yes  [x]No   Office of Aging  [] Yes  [x]No   NHP  [] Yes  [x]No   Return to the Nursing Home  [] Yes  [x]No   Rehab Therapy  [] Yes  [x]No   Acute Rehab  [] Yes  [x]No   Subacute Rehab  [] Yes  [x]No   Private Care  [] Yes  [x]No   Substance Abuse Referral  [] Yes  [x]No   Transportation  [] Yes  [x]No   Chore Service  [] Yes  [x]No   Inpatient Hospice  [] Yes  [x]No   OP RT  [] Yes  [x] No   OP Hemo  [] Yes  [x] No   OP PT  [] Yes  [x]No   Support Group  [] Yes  [x]No   Reach to Recovery  [] Yes  [x]No   OP Oncology Clinic  [] Yes  [x]No   Clinic Appointment  [] Yes  [x]No   DME  [] Yes  [x]No   Comments    Name of D/C Planner or  Given to Patient or Family Moises Obando RN   Phone Number [phone]   Date Aug 10, 2017   Time 734 am   If you are discharged home, whom do you designate to participate in your discharge plan and receive any information needed?      Enter name of Brian Ludwig  friend        Phone # of design         Address of [facility]         Updated Aug 10, 2017        Patient refused to designate any           individual

## 2017-08-10 NOTE — PROGRESS NOTES
conducted an initial consultation and Spiritual Assessment for [facility], who is a 49 y.o.,female. Patients Primary Language is: [place]. According to the patients EMR Hoahaoism Affiliation is: Rhea. The reason the Patient came to the hospital is:   Patient Active Problem List    Diagnosis Date Noted    HNP (herniated nucleus pulposus), cervical 08/09/2017        The  provided the following Interventions:  Initiated a relationship of care and support. Explored issues of [place], spirituality and/or Latter-day needs while hospitalized. Listened empathically. Provided chaplaincy education. Provided information about Spiritual Care Services. Offered prayer and assurance of continued prayers on patient's behalf. Chart reviewed. The following outcomes were achieved:  Patient shared some information about their medical narrative and spiritual journey/beliefs. Patient processed feeling about current hospitalization. Patient expressed gratitude for the 's visit. Assessment:  Patient did not indicate any spiritual or Latter-day issues which require Spiritual Care Services interventions at this time. Patient does not have any Latter-day/cultural needs that will affect patients preferences in health care. Plan:  Chaplains will continue to follow and will provide pastoral care on an as needed or requested basis.  recommends bedside caregivers page  on duty if patient shows signs of acute spiritual or emotional distress.     [address] [facility]   Staff [address] [facility]   [phone]

## 2017-08-10 NOTE — MED STUDENT NOTES
*ATTENTION:  This note has been created by a medical student for educational purposes only. Please do not refer to the content of this note for clinical decision-making, billing, or other purposes. Please see attending physicians note to obtain clinical information on this patient. *        Progress Note    Patient: Lobo Scherer MRN: [id number]  SSN: [id number]    YOB: 1960  Age: 49 y.o. Sex: female      Admit Date: 8/9/2017    LOS: 0 days     Subjective:     Mrs. Lobo Scherer is a pleasant 49year old female who is post-op day 1. She notes pain is well controlled. She denies bowel movement, however notes flatulance. She has been ambulating appropriately. She notes numbness in right upper extremity has significantly improved. She notes altered sensation with swallowing food. She denies pain, however feels like throat \"is dry\" with a sensation of \"something stuck\". She is able to tolerate liquids. She notes itching sensation around jawline near placement of neck brace. She notes rash on right forearm continues to be pruritic, however no changes are noted. She is using incentive spirometry due to concerns of pnemonia. Last pneumonia occurred in 2015. She is eager to be discharged, friend is standing by to pick her up. She is asking to be discharged with Flexeril for pain, instead of dilaudid.      Objective:     Vitals:    08/10/17 0030 08/10/17 0125 08/10/17 0435 08/10/17 0810   BP: 107/71 109/74 107/72 134/88   Pulse: (!) 111 (!) 110 100 (!) 101   Resp: 16 16 16 18   Temp: 98.1 °F (36.7 °C) 98.2 °F (36.8 °C) 98 °F (36.7 °C) 97.7 °F (36.5 °C)   SpO2: 90% 94% 93% 95%   Weight:       Height:            Intake and Output:  Current Shift: 08/10 0701 - 08/10 1900  In: -   Out: 400 [Urine:400]  Last three shifts: 08/08 1901 - 08/10 0700  In: 2403.3 [I.V.:2403.3]  Out: 1325 [Urine:1300]    Physical Exam:   GENERAL: alert, cooperative, no distress, appears stated age  LUNG: clear to auscultation bilaterally  HEART: regular rate and rhythm, S1, S2 normal, no murmur, click, rub or gallop  ABDOMEN: soft, non-tender, non-distended. Bowel sounds normal.   EXTREMITIES:  Posterior tibialis pulse 2+ bilaterally  SKIN: macular rash with surrounding erythema on right forearm  NEUROLOGIC: AOx3. Sensation and motor strength normal and symmetric. Lab/Data Review:  BMP:   Lab Results   Component Value Date/Time     08/10/2017 03:55 AM    K 4.7 08/10/2017 03:55 AM     08/10/2017 03:55 AM    CO2 26 08/10/2017 03:55 AM    AGAP 9 08/10/2017 03:55 AM     (H) 08/10/2017 03:55 AM    BUN 12 08/10/2017 03:55 AM    CREA 0.62 08/10/2017 03:55 AM    GFRAA >60 08/10/2017 03:55 AM    GFRNA >60 08/10/2017 03:55 AM     CMP:   Lab Results   Component Value Date/Time     08/10/2017 03:55 AM    K 4.7 08/10/2017 03:55 AM     08/10/2017 03:55 AM    CO2 26 08/10/2017 03:55 AM    AGAP 9 08/10/2017 03:55 AM     (H) 08/10/2017 03:55 AM    BUN 12 08/10/2017 03:55 AM    CREA 0.62 08/10/2017 03:55 AM    GFRAA >60 08/10/2017 03:55 AM    GFRNA >60 08/10/2017 03:55 AM    CA 8.4 (L) 08/10/2017 03:55 AM    ALB 3.2 (L) 08/10/2017 03:55 AM    TP 5.6 (L) 08/10/2017 03:55 AM    GLOB 2.4 08/10/2017 03:55 AM    AGRAT 1.3 08/10/2017 03:55 AM    SGOT 24 08/10/2017 03:55 AM    ALT 24 08/10/2017 03:55 AM     CBC:   Lab Results   Component Value Date/Time    WBC 8.2 08/10/2017 03:55 AM    HGB 12.6 08/10/2017 03:55 AM    HCT 37.8 08/10/2017 03:55 AM     08/10/2017 03:55 AM            Assessment:         HNP Cervical     DM      Plan:     Continue Cefazolin, insulin lispro. Continue Tylenol, flexeril PRN. Prepare for disposition. Signed By: Jayce Leach     August 10, 2017          *ATTENTION:  This note has been created by a medical student for educational purposes only. Please do not refer to the content of this note for clinical decision-making, billing, or other purposes.   Please see attending [id number] [facility] note to obtain clinical information on this patient. *

## 2017-08-10 NOTE — OP NOTES
Marcial Gil    Name:  Eric Samuels  MR#:  [id number]  :  1960  Account #:  [de-identified]  Date of Adm:  2017  Date of Surgery:  2017      PREOPERATIVE DIAGNOSES:  1. Right C6-7 disk herniation. a. Extruded. b. Status post C5-6 anterior cervical diskectomy and fusion was with  C-neha plating. POSTOPERATIVE DIAGNOSES:  1. Right C6-7 disk herniation. a. Extruded. b. Status post C5-6 anterior cervical diskectomy and fusion was with  C-neha plating. PROCEDURES PERFORMED:  1. Removal C-Neha plate and exploration of fusion, C5-6.  2. Anterior cervical diskectomy with nerve root and spinal cord  decompression, C6-C7. 3. Anterior cervical interbody fusion with structural tricortical iliac crest  bone bank bone graft, C6-C7. 4. Placement of 44 mm [place] plate from C5 to C7. SURGEON: CATIA Hernandez MD.    Todd Cary. ANESTHESIA: General.    FINDINGS:  1. The patient's plate was intact and fusion solid C5-C6. 2. The patient had a large extruded right C6-7 disk herniation with  fragment laying through the hole in the posterior annulus, but  contained by the posterior longitudinal ligament, producing significant  compression of the right C7 nerve root. DESCRIPTION OF PROCEDURE: Under general anesthesia, the  patient placed in supine position, roll under shoulders, head on a  support, peripheral nerves padded, left neck prepped and draped in a  sterile fashion. Utilizing headlight illumination and loupe magnification, previous  incision was opened. Medial border of sternocleidomastoid developed  and interval between the trachea and esophagus medially, carotid  sheath laterally developed, exposing the prevertebral scar. This was  bluntly dissected with Kitner dissectors, exposing the plate and the  plate was then outlined with Bovie, overlying scar was removed, device  unlocked and the screws were removed and the plate removed. Fusion  then explored with a curet and was solid. Dissection carried to the C7 vertebral body and retractor blades  placed, taking care to protect soft tissue. Anterior osteophytes were  removed with Leksell rongeurs. Disk removed in total with knife,  curettes, and grabbers and 14-mm [place] distraction pins placed,  interspace distracted, posterior hole in the annulus was visible and a  small amount of disk material was removed. Posterior osteophytes  were removed with the drill, decompressing the central foraminal  zones. Hole in the annulus enlarged and the posterior anulus removed  with 5-0 angled curet and multiple fragments of disk material were  readily apparent and removed and 2-mm Kerrison rongeurs were then  used to perform foraminotomies bilaterally. Posterior longitudinal  ligament fenestrated from the midportion into the right foramen further  decompressing the dural tube and nerve roots. Endplates made parallel with the drill. Size of bone plug was measured  to be 8 mm in height, 13 mm in depth, suitably sized structural  tricortical iliac crest bone bank bone graft was fashioned to the  appropriate size and impacted into the C6-7 disk space under lateral  C-arm control. A 40 mm Pyrenees plate was then placed C5-7 with 2 screws in each  vertebral body under AP and lateral C-arm control. The torque  screwdriver used for final tightening. All retractor blades were removed. There was no active bleeding. Trachea and esophagus medially, carotid sheath laterally inspected  and was satisfactory. Wound was irrigated and then closed in layers,  dressed. The patient awakened, taken to recovery room in satisfactory  condition with a soft cervical collar without complication. ESTIMATED BLOOD LOSS: 25 mL. FLUIDS: The patient received 1200 mL of crystalloid fluid. SPECIMENS: None. TUBES AND DRAINS: None. COUNTS: Needle and sponge count correct without complication.     At the time of dictation, the patient not awakened sufficiently to test  motor sensation.         MD Mirna Peres Ra / Girma Ca  D:  08/09/2017   16:46  T:  08/10/2017   03:35  Job #:  [id number]

## 2017-08-10 NOTE — PROGRESS NOTES
Problem: Falls - Risk of  Goal: *Absence of Falls  Document Deion Fall Risk and appropriate interventions in the flowsheet.    Outcome: Progressing Towards Goal  Fall Risk Interventions:              Medication Interventions: Patient to call before getting OOB     Elimination Interventions: Patient to call for help with toileting needs

## 2017-08-10 NOTE — DISCHARGE SUMMARY
Discharge Summary    Admit Date: 2017  Discharge Date:  8/10/2017     Patient ID:   Name:  Mauro Murphy      Age:  49 y.o.    :  1960    Admitting Diagnosis: hnp m50.20  HNP (herniated nucleus pulposus), cervical     Post Operative Day: 1    Operative Procedures:  REMOVE SPINE FIX DEV,ANTERIOR [08871] (SPINE ANTERIOR CERVICAL FUSION WITH INSTRUMENTATION)  EXPLORATION OF SPINAL FUSION [39535]  RI ARTHRODESIS ANT INTERBODY INC DISCECTOMY, CERVICAL BELOW C2 [63387]  RI ARTHRODESIS ANT INTERBODY INC DISCECTOMY, CERVICAL BELOW C2 EACH ADDL [90634]  INSERT VERT FIX DEV,ANT,2-3 SGMTS [53291]  RI ALLOGRAFT FOR SPINE SURGERY ONLY STRUCTURAL [22703]      Isolation Precautions:   Not required. Patient is not currently contagious. Physical Exam on Discharge:  Visit Vitals    /88 (BP 1 Location: Right arm, BP Patient Position: Sitting)    Pulse (!) 101    Temp 97.7 °F (36.5 °C)    Resp 18    Ht 5' 5\" (1.651 m)    Wt 80.5 kg (177 lb 9 oz)    SpO2 95%    BMI 29.55 kg/m2         General: in no apparent distress   Extremities:  Neurovascular intact    Dressing:  Dry   DVT Exam:   No evidence of DVT seen on physical exam;  compartments soft and NT.          Relevant labs within last 72 hours:    CBC w/Diff    Lab Results   Component Value Date/Time    WBC 8.2 08/10/2017 03:55 AM    RBC 4.25 08/10/2017 03:55 AM    HCT 37.8 08/10/2017 03:55 AM    MCV 88.9 08/10/2017 03:55 AM    MCH 29.6 08/10/2017 03:55 AM    MCHC 33.3 08/10/2017 03:55 AM    RDW 13.2 08/10/2017 03:55 AM    Lab Results   Component Value Date/Time    MONOS 4 08/10/2017 03:55 AM    EOS 0 08/10/2017 03:55 AM    BASOS 0 08/10/2017 03:55 AM    RDW 13.2 08/10/2017 03:55 AM          BMP   Lab Results   Component Value Date     08/10/2017    CO2 26 08/10/2017    BUN 12 08/10/2017          Coagulation   No results found for: INR, APTT           Condition at discharge: Afebrile  Ambulating  Eating, Drinking, Voiding  Stable    Current Discharge Medication List      CONTINUE these medications which have CHANGED    Details   cyclobenzaprine (FLEXERIL) 10 mg tablet Take 1 Tab by mouth three (3) times daily as needed for Muscle Spasm(s). Qty: 90 Tab, Refills: 0         CONTINUE these medications which have NOT CHANGED    Details   ALPRAZolam (XANAX) 0.25 mg tablet Take 0.25 mg by mouth nightly as needed. atorvastatin (LIPITOR) 40 mg tablet Take 40 mg by mouth nightly. diphenhydrAMINE (BENADRYL) 25 mg tablet Take 25 mg by mouth nightly as needed. dulaglutide (TRULICITY) 3.95 JW/2.7 mL sub-q pen 0.75 mg by SubCUTAneous route every seven (7) days. lisinopril (PRINIVIL, ZESTRIL) 2.5 mg tablet Take 2.5 mg by mouth nightly. ondansetron (ZOFRAN ODT) 4 mg disintegrating tablet Take 4 mg by mouth as needed. esomeprazole (NEXIUM) 40 mg capsule Take 40 mg by mouth daily. Refills: 1      HYDROcodone-acetaminophen (NORCO)  mg tablet Take 1 Tab by mouth every six (6) hours as needed for Pain. SITagliptin (JANUVIA) 100 mg tablet Take 100 mg by mouth daily. folic acid 655 mcg tablet Take 800 mcg by mouth daily. aspirin delayed-release 81 mg tablet Take 81 mg by mouth daily. ergocalciferol (VITAMIN D2) 50,000 unit capsule Take 50,000 Units by mouth two (2) times a week.      gabapentin (NEURONTIN) 300 mg capsule Take 300 mg by mouth three (3) times daily. mirabegron ER (MYRBETRIQ) 25 mg ER tablet Take 25 mg by mouth nightly. HYDROmorphone (DILAUDID) 2 mg tablet Take 2 mg by mouth nightly as needed. Refills: 0      metFORMIN ER (GLUCOPHAGE XR) 750 mg tablet Take 750 mg by mouth nightly. Refills: 3               PCP:  Alena Weinstein MD        Disposition:  Clear for discharge to home, if clear by medicine service. Follow-up in the office in 2 weeks with Dr. Zena Varela; call [phone] to schedule appointment.      Wound Care: Dressing down POD 5             -Ekta West LILIANA  8/10/2017  [facility] [phone]  [facility] [phone]

## 2017-08-10 NOTE — DISCHARGE INSTRUCTIONS
DISCHARGE SUMMARY from Nurse    The following personal items are in your possession at time of discharge:    Dental Appliances: None  Visual Aid: None     Home Medications: None  Jewelry: None                   PATIENT INSTRUCTIONS:    After general anesthesia or intravenous sedation, for 24 hours or while taking prescription Narcotics:  · Limit your activities  · Do not drive and operate hazardous machinery  · Do not make important personal or business decisions  · Do  not drink alcoholic beverages  · If you have not urinated within 8 hours after discharge, please contact your surgeon on call. Report the following to your surgeon:  · Excessive pain, swelling, redness or odor of or around the surgical area  · Temperature over 100.5  · Nausea and vomiting lasting longer than 4 hours or if unable to take medications  · Any signs of decreased circulation or nerve impairment to extremity: change in color, persistent  numbness, tingling, coldness or increase pain  · Any questions        What to do at Home:  Recommended activity: Activity as tolerated,     *  Please give a list of your current medications to your Primary Care Provider. *  Please update this list whenever your medications are discontinued, doses are      changed, or new medications (including over-the-counter products) are added. *  Please carry medication information at all times in case of emergency situations. These are general instructions for a healthy lifestyle:    No smoking/ No tobacco products/ Avoid exposure to second hand smoke    Surgeon General's Warning:  Quitting smoking now greatly reduces serious risk to your health.     Obesity, smoking, and sedentary lifestyle greatly increases your risk for illness    A healthy diet, regular physical exercise & weight monitoring are important for maintaining a healthy lifestyle    You may be retaining fluid if you have a history of heart failure or if you experience any of the following symptoms:  Weight gain of 3 pounds or more overnight or 5 pounds in a week, increased swelling in our hands or feet or shortness of breath while lying flat in bed. Please call your doctor as soon as you notice any of these symptoms; do not wait until your next office visit. Recognize signs and symptoms of STROKE:    F-face looks uneven    A-arms unable to move or move unevenly    S-speech slurred or non-existent    T-time-call 911 as soon as signs and symptoms begin-DO NOT go       Back to bed or wait to see if you get better-TIME IS BRAIN. Warning Signs of HEART ATTACK     Call 911 if you have these symptoms:   Chest discomfort. Most heart attacks involve discomfort in the center of the chest that lasts more than a few minutes, or that goes away and comes back. It can feel like uncomfortable pressure, squeezing, fullness, or pain.  Discomfort in other areas of the upper body. Symptoms can include pain or discomfort in one or both arms, the back, neck, jaw, or stomach.  Shortness of breath with or without chest discomfort.  Other signs may include breaking out in a cold sweat, nausea, or lightheadedness. Don't wait more than five minutes to call 911 - MINUTES MATTER! Fast action can save your life. Calling 911 is almost always the fastest way to get lifesaving treatment. Emergency Medical Services staff can begin treatment when they arrive -- up to an hour sooner than if someone gets to the hospital by car. The discharge information has been reviewed with the patient. The patient verbalized understanding. Discharge medications reviewed with the patient and appropriate educational materials and side effects teaching were provided. [employer] Activation    Thank you for requesting access to [employer]. Please follow the instructions below to securely access and download your online medical record.  [employer] allows you to send messages to your doctor, view your test results, renew your prescriptions, schedule appointments, and more. How Do I Sign Up? 1. In your internet browser, go to [email or web address]  2. Click on the First Time User? Click Here link in the Sign In box. You will be redirect to the New Member Sign Up page. 3. Enter your [employer] Access Code exactly as it appears below. You will not need to use this code after youve completed the sign-up process. If you do not sign up before the expiration date, you must request a new code. [employer] Access Code: [email or web address]  Expires: 2017  9:24 AM (This is the date your [employer] access code will )    4. Enter the last four digits of your Social Security Number (xxxx) and Date of Birth (mm/dd/yyyy) as indicated and click Submit. You will be taken to the next sign-up page. 5. Create a [employer] ID. This will be your [employer] login ID and cannot be changed, so think of one that is secure and easy to remember. 6. Create a [employer] password. You can change your password at any time. 7. Enter your Password Reset Question and Answer. This can be used at a later time if you forget your password. 8. Enter your e-mail address. You will receive e-mail notification when new information is available in [address] 9. Click Sign Up. You can now view and download portions of your medical record. 10. Click the Download Summary menu link to download a portable copy of your medical information. Additional Information    If you have questions, please visit the Frequently Asked Questions section of the [employer] website at [email or web address] Remember, [employer] is NOT to be used for urgent needs. For medical emergencies, dial 911.     Patient armband removed and shredded

## 2017-08-10 NOTE — ROUTINE PROCESS
Patient received in bed. She is awake, alert & oriented X 4. Collar brace in place, neck dressing intact. denies numbness, tingling. Bed locked in low position. Family present at the bedside. Call bell within reach. 2133 - Pt awake reports pain rate of 3.  2311 - Patient awake eating and in NAD.  2350 - complaints of pain, Dilaudid 1 mg IV prn given for pain. 8/10/17   0125 - MEWS score is 2. /74  Temp 98.2 Pulse ox sat of 94% on ra. Patient asymptomatic, denies sob, no cp and no apparent distress. 1617 - Percocet 2 tabs given for pain. 0430 - pain rate of 1 and pt awake in NAD.    0720 - Bedside and Verbal shift change report given to Jhonatan Montaño RN (oncoming nurse) by Loretta Gooden RN BSN (offgoing nurse). Report given with SBAR, Kardex, Intake/Output, MAR and Recent Results.

## 2017-08-11 NOTE — PROGRESS NOTES
Care Management Interventions  PCP Verified by CM: Yes  Mode of Transport at Discharge: Other (see comment)  Transition of Care Consult (CM Consult): Discharge Planning  MyChart Signup: No  Discharge Durable Medical Equipment: No  Physical Therapy Consult: No  Occupational Therapy Consult: Yes  Speech Therapy Consult: No  Current Support Network:  Other  Confirm Follow Up Transport: Family  Plan discussed with Pt/Family/Caregiver: Yes  Discharge Location  Discharge Placement: Home

## 2018-01-08 NOTE — ANESTHESIA POSTPROCEDURE EVALUATION
Post-Anesthesia Evaluation and Assessment    Patient: Lobo Scherer MRN: [id number]  SSN: [id number]    YOB: 1960  Age: 49 y.o. Sex: female      Data from PACU flowsheet    Cardiovascular Function/Vital Signs  Visit Vitals    /84    Pulse 90    Temp 36.8 °C (98.2 °F)    Resp 14    Ht 5' 5\" (1.651 m)    Wt 80.5 kg (177 lb 9 oz)    SpO2 93%    BMI 29.55 kg/m2       Patient is status post general anesthesia for Procedure(s):  REMOVE C-LARRY EXPLORE FUSION, ANTERIOR CERVICAL DISKECTOMY AND FUSION, BONE BANK BONE GRAFT PLATE X0-8. Nausea/Vomiting: controlled    Postoperative hydration reviewed and adequate. Pain:  Pain Scale 1: Numeric (0 - 10) (08/09/17 1747)  Pain Intensity 1: 0 (08/09/17 1747)   Managed      Mental Status and Level of Consciousness: Alert and oriented     Pulmonary Status:   O2 Device: Nasal cannula (08/09/17 1747)   Adequate oxygenation and airway patent    Complications related to anesthesia: None    Post-anesthesia assessment completed.  No concerns    Signed By: Aliza Rondon MD     August 9, 2017 Try netipot and sudafed for 3 days.  If not better we will start a heartburn medicine   If you don't need any additional medicine follow up in 6 months

## 2018-01-23 ENCOUNTER — IMPORTED ENCOUNTER (OUTPATIENT)
Dept: URBAN - METROPOLITAN AREA CLINIC 1 | Facility: CLINIC | Age: 58
End: 2018-01-23

## 2018-01-23 PROBLEM — H04.123: Noted: 2018-01-23

## 2018-01-23 PROBLEM — Z79.84: Noted: 2018-01-23

## 2018-01-23 PROBLEM — E11.3212: Noted: 2018-01-23

## 2018-01-23 PROBLEM — H25.813: Noted: 2018-01-23

## 2018-01-23 PROBLEM — E11.9: Noted: 2018-01-23

## 2018-01-23 PROBLEM — H16.143: Noted: 2018-01-23

## 2018-01-23 PROCEDURE — 92015 DETERMINE REFRACTIVE STATE: CPT

## 2018-01-23 PROCEDURE — 92012 INTRM OPH EXAM EST PATIENT: CPT

## 2018-01-23 PROCEDURE — 92250 FUNDUS PHOTOGRAPHY W/I&R: CPT

## 2018-01-23 NOTE — PATIENT DISCUSSION
1.  DM Type II (Oral Medication). without sign of diabetic retinopathy and no blot heme on dilated retinal examination today OD No Macular Edema:  Discussed the pathophysiology of diabetes and its effect on the eye and risk of blindness. Stressed the importance of strong glucose control. Advised of importance of at least yearly dilated examinations but to contact us immediately for any problems or concerns. 2.  DM Type II (Oral Medication). with Mild Nonproliferative Diabetic Retinopathy OS Macular Edema noted on today's exam: Mac photo today shows NPDR w/ Mac edema. Discussed the pathophysiology of diabetes and its effect on the eye and risk of blindness. Stressed the importance of strong glucose control. Advised of importance of at least yearly dilated examinations but to contact us immediately for any problems or concerns. 3. AJ w/ PEK OU- Cont ATs TID OU Routinely Cont AT Gel ashley QHS OU. 4.  Cataract OU: Observe for now without intervention. The patient was advised to contact us if any change or worsening of vision5. Progressive Non-Adapter Return for an appointment in 4 mo 10 dfe with Dr. Lima Pérez.

## 2018-05-22 ENCOUNTER — IMPORTED ENCOUNTER (OUTPATIENT)
Dept: URBAN - METROPOLITAN AREA CLINIC 1 | Facility: CLINIC | Age: 58
End: 2018-05-22

## 2018-05-22 PROBLEM — E11.9: Noted: 2018-05-22

## 2018-05-22 PROBLEM — Z79.84: Noted: 2018-05-22

## 2018-05-22 PROBLEM — H43.811: Noted: 2018-05-22

## 2018-05-22 PROBLEM — H25.813: Noted: 2018-05-22

## 2018-05-22 PROBLEM — H16.143: Noted: 2018-05-22

## 2018-05-22 PROBLEM — H04.123: Noted: 2018-05-22

## 2018-05-22 PROBLEM — E11.3292: Noted: 2018-05-22

## 2018-05-22 PROCEDURE — 92012 INTRM OPH EXAM EST PATIENT: CPT

## 2018-05-22 NOTE — PATIENT DISCUSSION
1.  DM Type II (On Insulin) without sign of diabetic retinopathy and no blot heme on dilated retinal examination today OD No Macular Edema:  Discussed the pathophysiology of diabetes and its effect on the eye and risk of blindness. Stressed the importance of strong glucose control. Advised of importance of at least yearly dilated examinations but to contact us immediately for any problems or concerns. 2.  DM Type II (On Insulin) with mild Nonproliferative Diabetic Retinopathy OS Improved from last visit No Macular Edema today:  Discussed the pathophysiology of diabetes and its effect on the eye and risk of blindness. Stressed the importance of strong glucose control. Advised of importance of at least yearly dilated examinations but to contact us immediately for any problems or concerns. 3. AJ w/ PEK OU- Increase ATs to TID OU routinely Restart AT Gel ashley QHS OU. 4.  PVD w/o Tear OD - RD precautions. 5.  Cataract OU: Observe for now without intervention. The patient was advised to contact us if any change or worsening of visionDiscussed with patient she should consider a computer rx for any computer work (Pt reported having eye strain at the computer but was using her Full range glasses) Letter to [facility] for an appointment in 4 mo 30 glare with Dr. Pili Concepcion

## 2018-10-11 ENCOUNTER — IMPORTED ENCOUNTER (OUTPATIENT)
Dept: URBAN - METROPOLITAN AREA CLINIC 1 | Facility: CLINIC | Age: 58
End: 2018-10-11

## 2018-10-11 PROBLEM — H00.14: Noted: 2018-10-11

## 2018-10-11 PROCEDURE — 92012 INTRM OPH EXAM EST PATIENT: CPT

## 2018-10-11 NOTE — PATIENT DISCUSSION
Chalazion HARLEY- D/C Emycin prescribed by Patient first. Recommend increasing hot compresses to 5 minutes TID. Pt finished Amoxicillin prescribed by pt first. Start Doxycycline 50mg PO BID. Discussed option for Incision and Drainage. Risks and Benefits discussed with patient. Patient stated complete understanding and would proceed with procedure if no improvement at next visit. Return as scheduled 23rd with Dr. Jacob Rodríguez.

## 2018-10-23 ENCOUNTER — IMPORTED ENCOUNTER (OUTPATIENT)
Dept: URBAN - METROPOLITAN AREA CLINIC 1 | Facility: CLINIC | Age: 58
End: 2018-10-23

## 2018-10-23 PROBLEM — H04.123: Noted: 2018-10-23

## 2018-10-23 PROBLEM — H25.813: Noted: 2018-10-23

## 2018-10-23 PROBLEM — Z79.84: Noted: 2018-10-23

## 2018-10-23 PROBLEM — H16.143: Noted: 2018-10-23

## 2018-10-23 PROBLEM — E11.3293: Noted: 2018-10-23

## 2018-10-23 PROCEDURE — 92015 DETERMINE REFRACTIVE STATE: CPT

## 2018-10-23 PROCEDURE — 92014 COMPRE OPH EXAM EST PT 1/>: CPT

## 2018-10-23 NOTE — PATIENT DISCUSSION
1.  DM Type II (Taking Insulin). with Mild Nonproliferative Diabetic Retinopathy OU No Macular Edema:  Discussed the pathophysiology of diabetes and its effect on the eye and risk of blindness. Stressed the importance of strong glucose control. Advised of importance of at least yearly dilated examinations but to contact us immediately for any problems or concerns. 2. Cataract OU: Observe for now without intervention. The patient was advised to contact us if any change or worsening of vision3. AJ w/ PEK OU- Use ATs TID OU Routinely. 4.  PVD w/o Tear OD - RD precautions. 5.  Chalazion HARLEY with surrounding cellulitis- resolved. Letter to PCP Return for an appointment in 6 mo 10 dfe glare with Dr. Christopher Cam.

## 2019-01-11 ENCOUNTER — IMPORTED ENCOUNTER (OUTPATIENT)
Dept: URBAN - METROPOLITAN AREA CLINIC 1 | Facility: CLINIC | Age: 59
End: 2019-01-11

## 2019-01-11 PROBLEM — H52.13: Noted: 2019-01-11

## 2019-01-11 PROBLEM — H52.4: Noted: 2019-01-11

## 2019-01-11 PROCEDURE — S0621 ROUTINE OPHTHALMOLOGICAL EXA: HCPCS

## 2019-01-11 NOTE — PATIENT DISCUSSION
1. Myopia: Rx was given for correction if indicated and requested. 2. Presbyopia3. Cataract OU 4. Dry Eyes w/ PEK OU 5. H/o DM w/ Mild NPDR OU 6. H/o PVD OD Discussed with patient she is not a good candidate for MF CTLs due to amount of astigmatism may consider trying if unable to adapt to monovision. Discussed the option of distance OU and to wear OTC readers over CTLs for near work vs. monovision (OD distance OS near). CTL trials given for monovision. Return for an appointment in 1 week cc with Dr. Joni Schaumann.

## 2019-01-22 ENCOUNTER — IMPORTED ENCOUNTER (OUTPATIENT)
Dept: URBAN - METROPOLITAN AREA CLINIC 1 | Facility: CLINIC | Age: 59
End: 2019-01-22

## 2019-01-22 NOTE — PATIENT DISCUSSION
1.  CC today - Will order new trials to help with dryness and to help sharpen distance vision. Near vision doing wellReturn for an appointment in 1 week cc after trial  with Dr. Perla Flores.

## 2019-02-12 ENCOUNTER — IMPORTED ENCOUNTER (OUTPATIENT)
Dept: URBAN - METROPOLITAN AREA CLINIC 1 | Facility: CLINIC | Age: 59
End: 2019-02-12

## 2019-02-12 NOTE — PATIENT DISCUSSION
1. CC Today OU -- Good Fit Comfort and Vision OU w/ the Bio[employer] CTL. Finalized CTL Rx was given to patient today. Return for an appointment in January 2020 for a 36 / CC OU with Dr. Pushpa King. Return as scheduled in April 2019 for 10 / [facility] OU appointment with Dr. Anthony Luo.

## 2019-04-23 ENCOUNTER — IMPORTED ENCOUNTER (OUTPATIENT)
Dept: URBAN - METROPOLITAN AREA CLINIC 1 | Facility: CLINIC | Age: 59
End: 2019-04-23

## 2019-04-23 PROBLEM — Z79.84: Noted: 2019-04-23

## 2019-04-23 PROBLEM — H04.123: Noted: 2019-04-23

## 2019-04-23 PROBLEM — E11.3293: Noted: 2019-04-23

## 2019-04-23 PROBLEM — H16.143: Noted: 2019-04-23

## 2019-04-23 PROBLEM — H25.813: Noted: 2019-04-23

## 2019-04-23 PROBLEM — H43.811: Noted: 2019-04-23

## 2019-04-23 PROCEDURE — 99213 OFFICE O/P EST LOW 20 MIN: CPT

## 2019-10-31 ENCOUNTER — IMPORTED ENCOUNTER (OUTPATIENT)
Dept: URBAN - METROPOLITAN AREA CLINIC 1 | Facility: CLINIC | Age: 59
End: 2019-10-31

## 2019-10-31 PROBLEM — Z79.84: Noted: 2019-10-31

## 2019-10-31 PROBLEM — H25.813: Noted: 2019-10-31

## 2019-10-31 PROBLEM — H43.811: Noted: 2019-10-31

## 2019-10-31 PROBLEM — H16.143: Noted: 2019-10-31

## 2019-10-31 PROBLEM — E11.3393: Noted: 2019-10-31

## 2019-10-31 PROBLEM — H04.123: Noted: 2019-10-31

## 2019-10-31 PROCEDURE — 92014 COMPRE OPH EXAM EST PT 1/>: CPT

## 2019-10-31 NOTE — PATIENT DISCUSSION
1.  DM Type II (Oral Medication) with Moderate Nonproliferative Diabetic Retinopathy OU No Macular Edema:  Discussed the pathophysiology of diabetes and its effect on the eye and risk of blindness. Stressed the importance of strong glucose control. Advised of importance of at least yearly dilated examinations but to contact us immediately for any problems or concerns. 2. Cataract OU: Observe for now without intervention. The patient was advised to contact us if any change or worsening of vision3. AJ w/ PEK OU- Recommend ATs TID OU routinely 4. PVD w/o Tear OD - RD precautions. 5.  H/o CL Wear - (Monovision OS Near)  Patient deferred Manifest Rx today. Return for an appointment in 6 months 10/DFGLORIA/sawyer with Dr. Rosi Massey. Return for an appointment in January 40/cc with Dr. Lucas Carter.

## 2020-01-28 ENCOUNTER — IMPORTED ENCOUNTER (OUTPATIENT)
Dept: URBAN - METROPOLITAN AREA CLINIC 1 | Facility: CLINIC | Age: 60
End: 2020-01-28

## 2020-01-28 PROBLEM — H52.4: Noted: 2020-01-28

## 2020-01-28 PROCEDURE — S0621 ROUTINE OPHTHALMOLOGICAL EXA: HCPCS

## 2020-01-28 NOTE — PATIENT DISCUSSION
1.  Presbyopia -- Rx was given for corrective spectacles if indicated. 2.  Astigmatism OU. 3. Cataract OU -- Observe4. AJ w/ PEK OU -- Recommend continue ATs TID OU routinely (sample of Refresh Relieva given ok to use with CTLs). 5. H/o DM Type II (Oral Medication) with Moderate Nonproliferative Diabetic Retinopathy OU No Macular Edema. Recommend keep appts with Dr. Edwin Sung. 6. PVD w/o Tear OD - RD precautions. 7.  CTL Wear -- H/o Monovision OS Near. Patient interested in trying MF Toric CTLs. CTL trials given today. Return for an appointment in 1 week for a CC with Dr. Perla Flores. Return for an appointment As scheduled for a 10/dfe/glare with Dr. Modesta Alcantara.

## 2020-02-07 ENCOUNTER — IMPORTED ENCOUNTER (OUTPATIENT)
Dept: URBAN - METROPOLITAN AREA CLINIC 1 | Facility: CLINIC | Age: 60
End: 2020-02-07

## 2020-02-07 NOTE — PATIENT DISCUSSION
CC today -- Good fit and comfort. Pt reports DVA OD is blurred adjusted CTLrx and dispensed a new CTL trial OD. Return for an appointment in 1 week CC with Dr. Yoel Richardson.

## 2020-04-30 ENCOUNTER — IMPORTED ENCOUNTER (OUTPATIENT)
Dept: URBAN - METROPOLITAN AREA CLINIC 1 | Facility: CLINIC | Age: 60
End: 2020-04-30

## 2020-04-30 PROBLEM — H16.143: Noted: 2020-04-30

## 2020-04-30 PROBLEM — E11.3393: Noted: 2020-04-30

## 2020-04-30 PROBLEM — H25.813: Noted: 2020-04-30

## 2020-04-30 PROBLEM — Z79.84: Noted: 2020-04-30

## 2020-04-30 PROBLEM — H04.123: Noted: 2020-04-30

## 2020-04-30 PROCEDURE — 92012 INTRM OPH EXAM EST PATIENT: CPT

## 2020-04-30 NOTE — PATIENT DISCUSSION
1.  DM Type II (Oral Medication) with Moderate Nonproliferative Diabetic Retinopathy OU No Macular Edema:  Discussed the pathophysiology of diabetes and its effect on the eye and risk of blindness. Stressed the importance of strong glucose control. Advised of importance of at least yearly dilated examinations but to contact us immediately for any problems or concerns. 2. Cataract OU -- Observe for now without intervention. The patient was advised to contact us if any change or worsening of vision3. AJ w/ PEK OU -- Recommend ATs TID OU routinely 4. PVD w/o Tear OD -- RD precautions. 5.  H/o CL Wear -- (Monovision OS Near)  Patient deferred Manifest Rx today. Return for an appointment in 6 months 30/glamel with Dr. Michelle Rosa. Return for an appointment in January 40/cc with Dr. Mike Dumont.

## 2020-04-30 NOTE — PATIENT DISCUSSION
1.  DM Type II with Moderate Nonproliferative Diabetic Retinopathy OU No Macular Edema:  Discussed the pathophysiology of diabetes and its effect on the eye and risk of blindness. Stressed the importance of strong glucose control. Advised of importance of at least yearly dilated examinations but to contact us immediately for any problems or concerns. 2. Cataract OU: Observe for now without intervention. The patient was advised to contact us if any change or worsening of vision3. AJ w/ PEK OU-The use/continuation of artificial tears were recommended.

## 2020-10-27 ENCOUNTER — IMPORTED ENCOUNTER (OUTPATIENT)
Dept: URBAN - METROPOLITAN AREA CLINIC 1 | Facility: CLINIC | Age: 60
End: 2020-10-27

## 2020-10-27 PROBLEM — H16.143: Noted: 2020-10-27

## 2020-10-27 PROBLEM — H04.123: Noted: 2020-10-27

## 2020-10-27 PROBLEM — Z79.84: Noted: 2020-10-27

## 2020-10-27 PROBLEM — E11.3393: Noted: 2020-10-27

## 2020-10-27 PROBLEM — H25.813: Noted: 2020-10-27

## 2020-10-27 PROBLEM — H43.811: Noted: 2020-10-27

## 2020-10-27 PROCEDURE — 92014 COMPRE OPH EXAM EST PT 1/>: CPT

## 2020-10-27 PROCEDURE — 92015 DETERMINE REFRACTIVE STATE: CPT

## 2020-10-27 NOTE — PATIENT DISCUSSION
1.  DM Type II (Oral Medication) with Moderate Nonproliferative Diabetic Retinopathy OU No Macular Edema:  Discussed the pathophysiology of diabetes and its effect on the eye and risk of blindness. Stressed the importance of strong glucose control. Advised of importance of at least yearly dilated examinations but to contact us immediately for any problems or concerns. 2. Cataract OU:  Visually Significant secondary to glare discussed the risks benefits alternatives and limitations of cataract surgery. The patient stated a full understanding and a desire to proceed with the procedure. The patient will need to return for preop appointment with cataract measurements and to have any additional questions answered and start pre-operative eye drops as directed. *Not MF Candidate Phaco PCL OS then OD Otherwise follow-up 6 months 10/DFE/glare 3. AJ w/ PEK OU- Recommend ATs TID OU routinely 4. PVD w/o Tear OD - RD precautions. 5.  H/o CL Wear - (Monovision OS Near) Return for an appointment for Ascan/H and P. with Dr. Sandee Nguyen.

## 2020-12-08 ENCOUNTER — IMPORTED ENCOUNTER (OUTPATIENT)
Dept: URBAN - METROPOLITAN AREA CLINIC 1 | Facility: CLINIC | Age: 60
End: 2020-12-08

## 2020-12-08 PROBLEM — H25.812: Noted: 2020-12-08

## 2020-12-08 PROCEDURE — 92136 OPHTHALMIC BIOMETRY: CPT

## 2020-12-08 NOTE — PATIENT DISCUSSION
1. Cataract OS --  Visually Significant secondary to glare discussed the risks benefits alternatives and limitations of cataract surgery. The patient stated a full understanding and a desire to proceed with the procedure. Discussed with patient if PO Gtts are more than $120 for all three combined when filling at their Pharmacy please call our office to request generic substitutions. Pt came to pre-op appointment today alone and Lifestyle Questionnaire Completed. *Guarded visual prognosis secondary to Moderate NPDR.  Phaco PCL OS

## 2020-12-16 ENCOUNTER — IMPORTED ENCOUNTER (OUTPATIENT)
Dept: URBAN - METROPOLITAN AREA CLINIC 1 | Facility: CLINIC | Age: 60
End: 2020-12-16

## 2020-12-17 ENCOUNTER — IMPORTED ENCOUNTER (OUTPATIENT)
Dept: URBAN - METROPOLITAN AREA CLINIC 1 | Facility: CLINIC | Age: 60
End: 2020-12-17

## 2020-12-17 PROBLEM — Z96.1: Noted: 2020-12-17

## 2020-12-17 PROCEDURE — 99024 POSTOP FOLLOW-UP VISIT: CPT

## 2020-12-17 NOTE — PATIENT DISCUSSION
POD#1 CE/IOL OS (Toric) doing well. *H/o Moderate NPDR OUUse Prednisolone BID OS Prolensa Qdaily OS Ocuflox TID OS : Use all three gtts through completion of PO gtt chart regimen/ Per our instructions given to patient.   Post op Warnings Reiterated RTC as scheduled

## 2020-12-22 ENCOUNTER — IMPORTED ENCOUNTER (OUTPATIENT)
Dept: URBAN - METROPOLITAN AREA CLINIC 1 | Facility: CLINIC | Age: 60
End: 2020-12-22

## 2020-12-22 PROBLEM — H25.811: Noted: 2020-12-22

## 2020-12-22 PROCEDURE — 92136 OPHTHALMIC BIOMETRY: CPT

## 2020-12-22 NOTE — PATIENT DISCUSSION
1.  Cataract OD: Visually Significant secondary to glare discussed the risks benefits alternatives and limitations of cataract surgery. The patient stated a full understanding and a desire to proceed with the procedure. Discussed with patient if PO Gtts are more than $120 for all three combined when filling at their Pharmacy please call our office to request generic substitutions. Pt understands they will need glasses post-op to achieve their best corrected vision. *H/o Moderate NPDR OUPhaco PCL OD *Goal OD: distance 2. POW#3  CE/IOL OS (Toric) doing well. *H/o Moderate NPDR OU *Goal OS: mid/distance Use Prednisolone BID OS && Prolensa Qdaily OS: Use through completion of po gtt regimen.  F/u as scheduled 2nd eye

## 2020-12-30 ENCOUNTER — IMPORTED ENCOUNTER (OUTPATIENT)
Dept: URBAN - METROPOLITAN AREA CLINIC 1 | Facility: CLINIC | Age: 60
End: 2020-12-30

## 2020-12-31 ENCOUNTER — IMPORTED ENCOUNTER (OUTPATIENT)
Dept: URBAN - METROPOLITAN AREA CLINIC 1 | Facility: CLINIC | Age: 60
End: 2020-12-31

## 2020-12-31 PROBLEM — Z96.1: Noted: 2020-12-31

## 2020-12-31 PROCEDURE — 99024 POSTOP FOLLOW-UP VISIT: CPT

## 2020-12-31 NOTE — PATIENT DISCUSSION
1. POD#1 Phaco/ PCL OD (Toric)- doing well. Use Prednisolone BID OD Prolensa Qdaily OD Ofloxacin TID OD. Use all three gtts through completion of PO gtt chart regimen/ Per our instructions given. Post op Warnings Reiterated 2. POW#3  CE/IOL OS (Toric) doing well. *H/o Moderate NPDR OU *Goal OS: mid/distance Use Prednisolone BID OS && Prolensa Qdaily OS: Use through completion of po gtt regimen.  RTC as scheduled

## 2021-01-25 ENCOUNTER — IMPORTED ENCOUNTER (OUTPATIENT)
Dept: URBAN - METROPOLITAN AREA CLINIC 1 | Facility: CLINIC | Age: 61
End: 2021-01-25

## 2021-01-25 PROBLEM — Z09: Noted: 2021-01-25

## 2021-01-25 PROCEDURE — 99024 POSTOP FOLLOW-UP VISIT: CPT

## 2021-01-25 NOTE — PATIENT DISCUSSION
POW#3 Phaco/ PCL Toric OU (Moderate NPDR) Finish PO meds per PO gtt schedule MRX for glasses givenReturn for an appointment in 3 months for a 10/DFE with Dr. Etelvina Hendricks.

## 2021-05-25 ENCOUNTER — IMPORTED ENCOUNTER (OUTPATIENT)
Dept: URBAN - METROPOLITAN AREA CLINIC 1 | Facility: CLINIC | Age: 61
End: 2021-05-25

## 2021-05-25 PROBLEM — E11.3393: Noted: 2021-05-25

## 2021-05-25 PROBLEM — Z79.84: Noted: 2021-05-25

## 2021-05-25 PROBLEM — H16.143: Noted: 2021-05-25

## 2021-05-25 PROBLEM — H04.123: Noted: 2021-05-25

## 2021-05-25 PROBLEM — Z96.1: Noted: 2021-05-25

## 2021-05-25 PROBLEM — H26.493: Noted: 2021-05-25

## 2021-05-25 PROCEDURE — 99214 OFFICE O/P EST MOD 30 MIN: CPT

## 2021-05-25 PROCEDURE — 92015 DETERMINE REFRACTIVE STATE: CPT

## 2021-05-25 PROCEDURE — 92134 CPTRZ OPH DX IMG PST SGM RTA: CPT

## 2021-05-25 NOTE — PATIENT DISCUSSION
1.  DM Type II (Oral Medication) with Moderate Nonproliferative Diabetic Retinopathy OU No Macular Edema: MAC OCT done today showing minimal RPE changes OU. Discussed the pathophysiology of diabetes and its effect on the eye and risk of blindness. Stressed the importance of strong glucose control. Advised of importance of at least yearly dilated examinations but to contact us immediately for any problems or concerns. 2. AJ w/ PEK OU- No relief from ATs alone. Begin Restasis BID OU (sample given erx) and recommend switch to PF ATs TID-QID OU routinely 3. PCO OU- Visually Significant *secondary to glare*; schedule YAG Cap OD then OS. Pros cons risks and benefits. 4.  Pseudophakia OU - (Toric OU)Return for an appointment in YAG Cap OD then OS with Dr. Willis Ghotra. Return for an appointment in post 80 day 10/MRX after YAG OU with Dr. Willis Ghotra.

## 2021-07-09 ENCOUNTER — IMPORTED ENCOUNTER (OUTPATIENT)
Dept: URBAN - METROPOLITAN AREA CLINIC 1 | Facility: CLINIC | Age: 61
End: 2021-07-09

## 2021-07-09 PROCEDURE — 66821 AFTER CATARACT LASER SURGERY: CPT

## 2021-07-09 NOTE — PATIENT DISCUSSION
YAG CAP OD: (Consent signed and scanned into attachments) 1 gtt Prolensa applied. The purpose and nature of the procedure possible alternative methods of treatment the risks involved and the possibility of complications were discussed with patient. The Patient wishes to proceed and the consent was signed. The laser was then performed under topical anesthesia with no complications. Post op instructions were given to patient as well as a follow-up appointment. Patient was advised to call our office if any questions or concerns. Return for an appointment for Return as scheduled with Dr. Michelle Rosa.

## 2021-07-10 PROBLEM — H26.491: Noted: 2021-07-10

## 2021-07-16 ENCOUNTER — IMPORTED ENCOUNTER (OUTPATIENT)
Dept: URBAN - METROPOLITAN AREA CLINIC 1 | Facility: CLINIC | Age: 61
End: 2021-07-16

## 2021-07-16 PROBLEM — H26.492: Noted: 2021-07-16

## 2021-07-16 PROCEDURE — 66821 AFTER CATARACT LASER SURGERY: CPT

## 2021-07-16 NOTE — PATIENT DISCUSSION
YAG CAP OS: (Consent signed and scanned into attachments) 1 gtt Prolensa applied. The purpose and nature of the procedure possible alternative methods of treatment the risks involved and the possibility of complications were discussed with patient. The Patient wishes to proceed and the consent was signed. The laser was then performed under topical anesthesia with no complications. Post op instructions were given to patient as well as a follow-up appointment. Patient was advised to call our office if any questions or concerns. Return for an appointment for 1-4 week po/yag with Dr. Sandee Nguyen.

## 2021-10-26 ENCOUNTER — IMPORTED ENCOUNTER (OUTPATIENT)
Dept: URBAN - METROPOLITAN AREA CLINIC 1 | Facility: CLINIC | Age: 61
End: 2021-10-26

## 2021-10-26 PROBLEM — H04.123: Noted: 2021-10-26

## 2021-10-26 PROBLEM — Z79.84: Noted: 2021-10-26

## 2021-10-26 PROBLEM — E11.3393: Noted: 2021-10-26

## 2021-10-26 PROBLEM — H16.143: Noted: 2021-10-26

## 2021-10-26 PROCEDURE — 92015 DETERMINE REFRACTIVE STATE: CPT

## 2021-10-26 PROCEDURE — 92012 INTRM OPH EXAM EST PATIENT: CPT

## 2021-10-26 NOTE — PATIENT DISCUSSION
1.  DM Type II (Oral Medication) with Moderate Nonproliferative Diabetic Retinopathy OU No Macular Edema: MAC OCT done today showing minimal RPE changes OU. Discussed the pathophysiology of diabetes and its effect on the eye and risk of blindness. Stressed the importance of strong glucose control. Advised of importance of at least yearly dilated examinations but to contact us immediately for any problems or concerns. 2. AJ w/ PEK OU -- PEK improving on Restasis. Continue Restasis BID OU and recommend switch to PF ATs TID-QID OU routinely 3. Pseudophakia OU -- (Toric OU) S/p YAG Cap OUMRX for glasses given. Return for an appointment in 6 month 27 with Dr. Etelvina Hendricks.

## 2022-04-02 ASSESSMENT — VISUAL ACUITY
OD_SC: 20/20
OD_SC: 20/20
OD_GLARE: 20/60
OS_GLARE: 20/60
OD_SC: J7
OU_CC: J1
OS_CC: J1
OS_CC: 20/40
OD_CC: 20/30-1
OD_CC: 20/30
OD_CC: 20/30
OD_CC: J2
OS_CC: 20/40
OS_GLARE: 20/150
OD_SC: 20/20
OD_CC: 20/30
OD_CC: 20/20
OS_SC: 20/25
OS_CC: 20/70
OS_SC: J7
OD_SC: 20/20
OD_GLARE: 20/50
OD_CC: 20/20
OD_PH: SC 20/25
OD_SC: 20/20
OS_CC: 20/40
OS_SC: 20/20
OS_CC: 20/50
OS_CC: 20/40
OS_GLARE: 20/60
OD_CC: 20/40-2
OD_GLARE: 20/70
OD_CC: 20/30
OS_CC: 20/25
OS_CC: J1
OD_SC: 20/20
OS_PH: SC 20/25
OS_CC: 20/50
OD_CC: 20/30-2
OS_CC: 20/40+2
OS_CC: 20/30-2
OD_CC: 20/30-1
OD_SC: 20/20
OS_SC: 20/20
OD_SC: 20/25
OS_CC: J2
OD_SC: 20/80
OU_CC: J2
OD_GLARE: 20/70
OS_CC: 20/40
OD_CC: 20/25-2
OS_SC: 20/30
OD_GLARE: 20/40
OS_CC: J1
OS_CC: 20/30-1
OS_PH: SC 20/40
OS_GLARE: 20/60
OS_SC: 20/20
OS_GLARE: 20/40
OD_CC: 20/30
OS_PH: SC 20/25
OS_GLARE: 20/60
OS_CC: 20/30
OS_CC: 20/50
OD_GLARE: 20/60
OS_PH: SC 20/25
OU_SC: 20/30

## 2022-04-02 ASSESSMENT — TONOMETRY
OD_IOP_MMHG: 16
OS_IOP_MMHG: 14
OS_IOP_MMHG: 15
OS_IOP_MMHG: 13
OS_IOP_MMHG: 12
OS_IOP_MMHG: 16
OD_IOP_MMHG: 15
OD_IOP_MMHG: 14
OD_IOP_MMHG: 16
OD_IOP_MMHG: 13
OS_IOP_MMHG: 15
OS_IOP_MMHG: 13
OD_IOP_MMHG: 14
OD_IOP_MMHG: 16
OS_IOP_MMHG: 16
OD_IOP_MMHG: 15
OD_IOP_MMHG: 16
OS_IOP_MMHG: 15
OD_IOP_MMHG: 16
OS_IOP_MMHG: 13
OD_IOP_MMHG: 14
OS_IOP_MMHG: 14
OS_IOP_MMHG: 16
OD_IOP_MMHG: 15
OS_IOP_MMHG: 17
OS_IOP_MMHG: 14
OD_IOP_MMHG: 12
OD_IOP_MMHG: 14
OS_IOP_MMHG: 15
OD_IOP_MMHG: 15
OS_IOP_MMHG: 14

## 2022-04-02 ASSESSMENT — KERATOMETRY
OS_AXISANGLE2_DEGREES: 032
OD_K2POWER_DIOPTERS: 45.25
OD_K1POWER_DIOPTERS: 44.00
OS_K1POWER_DIOPTERS: 44.25
OS_K2POWER_DIOPTERS: 45.25
OS_AXISANGLE_DEGREES: 122
OD_AXISANGLE2_DEGREES: 147
OD_AXISANGLE_DEGREES: 057

## 2022-07-08 ENCOUNTER — COMPREHENSIVE EXAM (OUTPATIENT)
Dept: URBAN - METROPOLITAN AREA CLINIC 1 | Facility: CLINIC | Age: 62
End: 2022-07-08

## 2022-07-08 DIAGNOSIS — H16.143: ICD-10-CM

## 2022-07-08 DIAGNOSIS — H04.123: ICD-10-CM

## 2022-07-08 DIAGNOSIS — Z79.4: ICD-10-CM

## 2022-07-08 DIAGNOSIS — Z96.1: ICD-10-CM

## 2022-07-08 DIAGNOSIS — E11.3393: ICD-10-CM

## 2022-07-08 PROCEDURE — 92015 DETERMINE REFRACTIVE STATE: CPT

## 2022-07-08 PROCEDURE — 92014 COMPRE OPH EXAM EST PT 1/>: CPT

## 2022-07-08 ASSESSMENT — VISUAL ACUITY
OS_SC: 20/60
OD_SC: 20/20

## 2022-07-08 ASSESSMENT — TONOMETRY
OS_IOP_MMHG: 14
OD_IOP_MMHG: 14

## 2022-07-08 NOTE — PATIENT DISCUSSION
Discussed the pathophysiology of diabetes and its effect on the eye and risk of blindness. Stressed the importance of strong glucose control. Advised of importance of at least yearly dilated examinations but to contact us immediately for any problems or concerns.

## (undated) DEVICE — 10FR FRAZIER SUCTION HANDLE: Brand: CARDINAL HEALTH

## (undated) DEVICE — MAGNETIC INSTRUMENT PAD 10" X 16"; MEDIUM; DISPOSABLE: Brand: CARDINAL HEALTH

## (undated) DEVICE — TOWEL: OR BLU 80/CS: Brand: MEDICAL ACTION INDUSTRIES

## (undated) DEVICE — INTENDED FOR TISSUE SEPARATION, AND OTHER PROCEDURES THAT REQUIRE A SHARP SURGICAL BLADE TO PUNCTURE OR CUT.: Brand: BARD-PARKER ® STAINLESS STEEL BLADES

## (undated) DEVICE — SPONGE: SPECIALTY K-DISS XR  100/CS: Brand: MEDICAL ACTION INDUSTRIES

## (undated) DEVICE — CASPAR DISTR PIN18MMSTER: Brand: AESCULAP

## (undated) DEVICE — BIT DRL STOP CLR CODE 2.3X14MM

## (undated) DEVICE — SUTURE COAT VCRL PC 5 PRECIS COSM CONVENTIONAL CUT PRIM 3 8 J823H

## (undated) DEVICE — SYRINGE MED 20ML STD CLR PLAS LUERLOCK TIP N CTRL DISP

## (undated) DEVICE — WAX SURG 2.5GM HEMSTAT BNE BEESWAX PARAFFIN ISO PALMITATE

## (undated) DEVICE — BRUSH SCRB PCMX NL CLN 12ML --

## (undated) DEVICE — BLADE RMFG OSC COARSE 25X9MM -- LAWSON OEM ITEM 225903

## (undated) DEVICE — SCREW EXT FIX L16MM FOR DISTRCTN

## (undated) DEVICE — PREP SKN DURAPREP 26ML APPL --

## (undated) DEVICE — NDL SPNE QNCKE 18GX3.5IN LF --

## (undated) DEVICE — GOWN,SIRUS,NONRNF,SETINSLV,2XL,18/CS: Brand: MEDLINE

## (undated) DEVICE — SURGIFOAM SPNG SZ 100

## (undated) DEVICE — SUTURE VCRL SZ 3-0 L27IN ABSRB UD L26MM SH 1/2 CIR J416H

## (undated) DEVICE — STERILE POLYISOPRENE POWDER-FREE SURGICAL GLOVES: Brand: PROTEXIS

## (undated) DEVICE — NDL PRT INJ NSAF BLNT 18GX1.5 --

## (undated) DEVICE — X-RAY DETECTABLE SPONGES,12 PLY: Brand: VISTEC

## (undated) DEVICE — PREP CHLORAPREP 10.5 ML ORG --

## (undated) DEVICE — THYROID SHEET: Brand: CONVERTORS

## (undated) DEVICE — DRAPE,TOWEL,LARGE,INVISISHIELD: Brand: MEDLINE

## (undated) DEVICE — SOL IRRIGATION INJ NACL 0.9% 500ML BTL

## (undated) DEVICE — (D)SYR 10ML 1/5ML GRAD NSAF -- PKGING CHANGE USE ITEM 338027

## (undated) DEVICE — REM POLYHESIVE ADULT PATIENT RETURN ELECTRODE: Brand: VALLEYLAB

## (undated) DEVICE — Device

## (undated) DEVICE — SPONGE GZ W4XL4IN COT 12 PLY TYP VII WVN C FLD DSGN

## (undated) DEVICE — ZINACTIVE USE 2641837 CLIP LIG M BLU TI HRT SHP WIRE HORZ 600 PER BX

## (undated) DEVICE — KENDALL SCD EXPRESS SLEEVES, KNEE LENGTH, MEDIUM: Brand: KENDALL SCD

## (undated) DEVICE — PRECISION THIN (9.0 X 0.38 X 25.0MM)

## (undated) DEVICE — INTENDED FOR TISSUE SEPARATION, AND OTHER PROCEDURES THAT REQUIRE A SHARP SURGICAL BLADE TO PUNCTURE OR CUT.: Brand: BARD-PARKER ® CARBON RIB-BACK BLADES

## (undated) DEVICE — TOOL 14MH30 LEGEND 14CM 3MM: Brand: MIDAS REX ™

## (undated) DEVICE — TOWEL SURG W16XL26IN BLU NONFENESTRATED DLX ST 2 PER PK

## (undated) DEVICE — GOWN,SIRUS,FABRNF,XL,20/CS: Brand: MEDLINE

## (undated) DEVICE — SPONGE SURG WHT KTNR DISECT RADPQ ST

## (undated) DEVICE — DRAPE,THYROID,SOFT,STERILE: Brand: MEDLINE

## (undated) DEVICE — SHEET,DRAPE,70X100,STERILE: Brand: MEDLINE

## (undated) DEVICE — CATHETER IV 18GA L1.25IN GRN FEP SFTY STR HUB RADPQ DISP

## (undated) DEVICE — CLIP INT SM WIDE RED TI TRNSVRS GRV CHEVRON SHP W PRECIS

## (undated) DEVICE — SYRINGE BLB 50CC IRRIG PLIABLE FNGR FLNG GRAD FLSK DISP

## (undated) DEVICE — (D)TAPE SURG ADHESIVE 3 -- DISC BY MFR USE ITEM 100078

## (undated) DEVICE — 3M™ IOBAN™ 2 ANTIMICROBIAL INCISE DRAPE 6650EZ: Brand: IOBAN™ 2

## (undated) DEVICE — SPINE PACK DEPAUL: Brand: MEDLINE INDUSTRIES, INC.

## (undated) DEVICE — STERILE POLYISOPRENE POWDER-FREE SURGICAL GLOVES WITH EMOLLIENT COATING: Brand: PROTEXIS